# Patient Record
Sex: FEMALE | Race: WHITE | Employment: FULL TIME | URBAN - METROPOLITAN AREA
[De-identification: names, ages, dates, MRNs, and addresses within clinical notes are randomized per-mention and may not be internally consistent; named-entity substitution may affect disease eponyms.]

---

## 2017-01-17 LAB
ALT SERPL-CCNC: 44 U/L (ref 14–63)
AST SERPL-CCNC: 25 U/L (ref 15–37)

## 2017-02-14 ENCOUNTER — TRANSFERRED RECORDS (OUTPATIENT)
Dept: HEALTH INFORMATION MANAGEMENT | Facility: CLINIC | Age: 32
End: 2017-02-14

## 2018-05-28 ENCOUNTER — TRANSFERRED RECORDS (OUTPATIENT)
Dept: HEALTH INFORMATION MANAGEMENT | Facility: CLINIC | Age: 33
End: 2018-05-28

## 2018-05-28 LAB
ALT SERPL-CCNC: 198 U/L (ref 14–63)
AST SERPL-CCNC: 165 U/L (ref 15–37)
CREAT SERPL-MCNC: 0.64 MG/DL (ref 0.51–0.95)
GFR SERPL CREATININE-BSD FRML MDRD: >60 ML/MIN/1.73M2 (ref 60–150)
GLUCOSE SERPL-MCNC: 112 MG/DL (ref 74–100)
POTASSIUM SERPL-SCNC: 3.1 MMOL/L (ref 3.5–5.1)

## 2018-06-01 ENCOUNTER — TRANSFERRED RECORDS (OUTPATIENT)
Dept: HEALTH INFORMATION MANAGEMENT | Facility: CLINIC | Age: 33
End: 2018-06-01

## 2018-06-01 LAB — PAP SMEAR - HIM PATIENT REPORTED: NEGATIVE

## 2018-07-04 ENCOUNTER — TRANSFERRED RECORDS (OUTPATIENT)
Dept: HEALTH INFORMATION MANAGEMENT | Facility: CLINIC | Age: 33
End: 2018-07-04

## 2018-09-15 ENCOUNTER — TRANSFERRED RECORDS (OUTPATIENT)
Dept: HEALTH INFORMATION MANAGEMENT | Facility: CLINIC | Age: 33
End: 2018-09-15

## 2018-09-15 LAB
ALT SERPL-CCNC: 23 U/L (ref 14–63)
AST SERPL-CCNC: 27 U/L (ref 15–37)
CREAT SERPL-MCNC: 0.61 MG/DL (ref 0.51–0.95)
GFR SERPL CREATININE-BSD FRML MDRD: >60 ML/MIN/1.73M2 (ref 60–150)
GLUCOSE SERPL-MCNC: 93 MG/DL (ref 74–100)
POTASSIUM SERPL-SCNC: 3.2 MMOL/L (ref 3.5–5.1)

## 2019-02-27 ENCOUNTER — OFFICE VISIT (OUTPATIENT)
Dept: FAMILY MEDICINE | Facility: CLINIC | Age: 34
End: 2019-02-27
Payer: COMMERCIAL

## 2019-02-27 VITALS
SYSTOLIC BLOOD PRESSURE: 146 MMHG | HEART RATE: 100 BPM | TEMPERATURE: 98.9 F | DIASTOLIC BLOOD PRESSURE: 104 MMHG | WEIGHT: 187 LBS

## 2019-02-27 DIAGNOSIS — K50.919 CROHN'S DISEASE WITH COMPLICATION, UNSPECIFIED GASTROINTESTINAL TRACT LOCATION (H): ICD-10-CM

## 2019-02-27 DIAGNOSIS — R53.83 OTHER FATIGUE: ICD-10-CM

## 2019-02-27 DIAGNOSIS — F41.1 GENERALIZED ANXIETY DISORDER: Primary | ICD-10-CM

## 2019-02-27 DIAGNOSIS — F10.29 ALCOHOL DEPENDENCE WITH UNSPECIFIED ALCOHOL-INDUCED DISORDER (H): ICD-10-CM

## 2019-02-27 DIAGNOSIS — I10 BENIGN ESSENTIAL HYPERTENSION: ICD-10-CM

## 2019-02-27 LAB
ALBUMIN UR-MCNC: 30 MG/DL
APPEARANCE UR: CLEAR
BACTERIA #/AREA URNS HPF: ABNORMAL /HPF
BILIRUB UR QL STRIP: NEGATIVE
COLOR UR AUTO: YELLOW
ERYTHROCYTE [DISTWIDTH] IN BLOOD BY AUTOMATED COUNT: 12.6 % (ref 10–15)
GLUCOSE UR STRIP-MCNC: NEGATIVE MG/DL
HCG UR QL: NEGATIVE
HCT VFR BLD AUTO: 45.6 % (ref 35–47)
HGB BLD-MCNC: 15.2 G/DL (ref 11.7–15.7)
HGB UR QL STRIP: NEGATIVE
KETONES UR STRIP-MCNC: ABNORMAL MG/DL
LEUKOCYTE ESTERASE UR QL STRIP: NEGATIVE
MCH RBC QN AUTO: 32.5 PG (ref 26.5–33)
MCHC RBC AUTO-ENTMCNC: 33.3 G/DL (ref 31.5–36.5)
MCV RBC AUTO: 97 FL (ref 78–100)
MUCOUS THREADS #/AREA URNS LPF: PRESENT /LPF
NITRATE UR QL: NEGATIVE
NON-SQ EPI CELLS #/AREA URNS LPF: ABNORMAL /LPF
PH UR STRIP: 5.5 PH (ref 5–7)
PLATELET # BLD AUTO: 147 10E9/L (ref 150–450)
RBC # BLD AUTO: 4.68 10E12/L (ref 3.8–5.2)
RBC #/AREA URNS AUTO: ABNORMAL /HPF
SOURCE: ABNORMAL
SP GR UR STRIP: >1.03 (ref 1–1.03)
UROBILINOGEN UR STRIP-ACNC: 0.2 EU/DL (ref 0.2–1)
WBC # BLD AUTO: 8.9 10E9/L (ref 4–11)
WBC #/AREA URNS AUTO: ABNORMAL /HPF

## 2019-02-27 PROCEDURE — 85027 COMPLETE CBC AUTOMATED: CPT | Performed by: FAMILY MEDICINE

## 2019-02-27 PROCEDURE — 81001 URINALYSIS AUTO W/SCOPE: CPT | Performed by: FAMILY MEDICINE

## 2019-02-27 PROCEDURE — 99203 OFFICE O/P NEW LOW 30 MIN: CPT | Performed by: FAMILY MEDICINE

## 2019-02-27 PROCEDURE — 80053 COMPREHEN METABOLIC PANEL: CPT | Performed by: FAMILY MEDICINE

## 2019-02-27 PROCEDURE — 36415 COLL VENOUS BLD VENIPUNCTURE: CPT | Performed by: FAMILY MEDICINE

## 2019-02-27 PROCEDURE — 81025 URINE PREGNANCY TEST: CPT | Performed by: FAMILY MEDICINE

## 2019-02-27 RX ORDER — LISINOPRIL 10 MG/1
1 TABLET ORAL DAILY
Refills: 0 | COMMUNITY
Start: 2019-01-21 | End: 2019-03-14

## 2019-02-27 RX ORDER — PREDNISONE 20 MG/1
30 TABLET ORAL
Refills: 0 | COMMUNITY
Start: 2018-07-05 | End: 2019-04-11

## 2019-02-27 RX ORDER — ATENOLOL 50 MG/1
1 TABLET ORAL DAILY
Refills: 0 | COMMUNITY
Start: 2019-01-21 | End: 2019-03-13 | Stop reason: ALTCHOICE

## 2019-02-27 RX ORDER — CITALOPRAM HYDROBROMIDE 20 MG/1
30 TABLET ORAL DAILY
Refills: 0 | COMMUNITY
Start: 2019-01-21 | End: 2019-03-13

## 2019-02-27 RX ORDER — HYDROCHLOROTHIAZIDE 12.5 MG/1
1 CAPSULE ORAL DAILY
Refills: 0 | COMMUNITY
Start: 2019-01-21 | End: 2019-03-14

## 2019-02-27 ASSESSMENT — ANXIETY QUESTIONNAIRES
IF YOU CHECKED OFF ANY PROBLEMS ON THIS QUESTIONNAIRE, HOW DIFFICULT HAVE THESE PROBLEMS MADE IT FOR YOU TO DO YOUR WORK, TAKE CARE OF THINGS AT HOME, OR GET ALONG WITH OTHER PEOPLE: EXTREMELY DIFFICULT
GAD7 TOTAL SCORE: 15
3. WORRYING TOO MUCH ABOUT DIFFERENT THINGS: NEARLY EVERY DAY
6. BECOMING EASILY ANNOYED OR IRRITABLE: NOT AT ALL
2. NOT BEING ABLE TO STOP OR CONTROL WORRYING: NEARLY EVERY DAY
5. BEING SO RESTLESS THAT IT IS HARD TO SIT STILL: NOT AT ALL
1. FEELING NERVOUS, ANXIOUS, OR ON EDGE: NEARLY EVERY DAY
7. FEELING AFRAID AS IF SOMETHING AWFUL MIGHT HAPPEN: NEARLY EVERY DAY

## 2019-02-27 ASSESSMENT — PATIENT HEALTH QUESTIONNAIRE - PHQ9
5. POOR APPETITE OR OVEREATING: NEARLY EVERY DAY
SUM OF ALL RESPONSES TO PHQ QUESTIONS 1-9: 17

## 2019-02-27 NOTE — PROGRESS NOTES
SUBJECTIVE:   Page Moreira is a 33 year old female who presents to clinic today for the following health issues:        Generalized fatigue abdominal discomfort.     History of Chrons disease - has had a recent flare. Has been taking an old prednisone script     Description:   Character: Dull ache and Cramping    Intensity: moderate    Progression of Symptoms:  worsening    Accompanying Signs & Symptoms:  Fever/Chills?: YES  Gas/Bloating: no   Nausea: YES  Vomitting: YES  Diarrhea?: YES  Constipation:no   Dysuria or Hematuria: no    History:   Trauma: no   Previous similar pain: YES   Previous tests done: Colonoscopy    Precipitating factors:   Does the pain change with:     Food: YES       Therapies Tried and outcome: started predisone yesterday     LMP:  2/5/2019     Patient came today to discuss symptoms of generalized fatigue tiredness increased anxiety.  She has history of alcohol abuse and currently seeing some psychologist but she is still drinking, last drink was almost 1 week ago.  She noticed slight increase in GI symptoms including increased diarrhea nausea.  She has history of Crohn's disease and she sees gastroenterologist.  Patient is currently at her mom house and she forgot to bring her medication for blood pressure.  She is currently on Celexa 20 mg for her anxiety depression.  She thinks her anxiety is slight worsening over the last 1 week due to her symptoms.  No abdominal focal tenderness.  No headaches no blurry vision no neurological symptoms.  No chest pain no shortness of breath.      Problem list and histories reviewed & adjusted, as indicated.      There is no problem list on file for this patient.    No past surgical history on file.    Social History     Tobacco Use     Smoking status: Current Every Day Smoker     Packs/day: 1.00     Types: Cigarettes     Smokeless tobacco: Never Used   Substance Use Topics     Alcohol use: Yes     No family history on file.      Current Outpatient  Medications   Medication Sig Dispense Refill     atenolol (TENORMIN) 50 MG tablet 1 tablet daily  0     citalopram (CELEXA) 20 MG tablet 30 mg daily  0     hydrochlorothiazide (MICROZIDE) 12.5 MG capsule 1 capsule daily  0     lisinopril (PRINIVIL/ZESTRIL) 10 MG tablet 1 tablet daily  0     predniSONE (DELTASONE) 20 MG tablet 30 mg.  0       Reviewed and updated as needed this visit by clinical staff  Tobacco  Allergies  Meds  Soc Hx      Reviewed and updated as needed this visit by Provider         ROS:  CONSTITUTIONAL: NEGATIVE for fever, chills, change in weight  ENT/MOUTH: NEGATIVE for ear, mouth and throat problems  RESP: NEGATIVE for significant cough or SOB  CV: NEGATIVE for chest pain, palpitations or peripheral edema    OBJECTIVE:                                                    BP (!) 146/104   Pulse 100   Temp 98.9  F (37.2  C) (Tympanic)   Wt 84.8 kg (187 lb)   LMP 02/05/2019 (Approximate)   There is no height or weight on file to calculate BMI.   GENERAL: healthy, alert, well nourished, well hydrated, no distress  NECK: no tenderness, no adenopathy, no asymmetry, no masses, no stiffness; thyroid- normal to palpation  RESP: lungs clear to auscultation - no rales, no rhonchi, no wheezes  CV: regular rates and rhythm, normal S1 S2, no S3 or S4 and no murmur, no click or rub -  ABDOMEN: soft, no tenderness, no  hepatosplenomegaly, no masses, normal bowel sounds  NEURO: strength and tone- normal, sensory exam- grossly normal, mentation- intact, speech- normal, reflexes- symmetric  Increase anxiety.     ASSESSMENT/PLAN:                                                        ICD-10-CM    1. Generalized anxiety disorder F41.1    2. Alcohol dependence with unspecified alcohol-induced disorder (H) F10.29    3. Benign essential hypertension I10    4. Crohn's disease with complication, unspecified gastrointestinal tract location (H) K50.919 CBC with platelets   5. Other fatigue R53.83 CBC with platelets      Comprehensive metabolic panel     UA reflex to Microscopic     HCG Qual, Urine ([id number])       Patient labs including CBC and urine are noncontributory.  Her symptoms could be due to mild to moderate flare of her Crohn's versus slight increase in anxiety.  Patient advised to get her medication and start taking her blood pressure medication.  She is working with psychology for alcohol issues.  Warning signs were discussed with the patient.  Any worsening fever, chest pain shortness of breath or any abdominal discomfort which is worsening or focal tenderness she may need to be seen in the ER for further evaluation.  Patient has slight increased anxiety due to her work, along with her symptoms.  She is advised if symptoms are improved her anxiety might be better.  However I advised her if she is worsening in terms of anxiety or symptoms she may need to be seen sooner.    Fam Marx MD  [facility] [place]

## 2019-02-27 NOTE — Clinical Note
Please abstract the following data from this visit with this patient into the appropriate field in Epic:Pap smear done on this date: 06/2018 (approximately), by this group: [facility], results were normal.

## 2019-02-27 NOTE — LETTER
March 4, 2019      Page Moreira  [address]  [place] [address]        Dear ,        I have reviewed your recent labs. Here are the results:     -Normal red blood cell (hgb) levels, normal white blood cell count and platelets are low normal..   -Liver and gallbladder tests are normal (ALT,AST, Alk phos, bilirubin), kidney function is normal (Cr, GFR), sodium is normal, potassium is normal, calcium is normal, glucose is normal for not in diabetic range.     Resulted Orders   CBC with platelets   Result Value Ref Range    WBC 8.9 4.0 - 11.0 10e9/L    RBC Count 4.68 3.8 - 5.2 10e12/L    Hemoglobin 15.2 11.7 - 15.7 g/dL    Hematocrit 45.6 35.0 - 47.0 %    MCV 97 78 - 100 fl    MCH 32.5 26.5 - 33.0 pg    MCHC 33.3 31.5 - 36.5 g/dL    RDW 12.6 10.0 - 15.0 %    Platelet Count 147 (L) 150 - 450 10e9/L   Comprehensive metabolic panel   Result Value Ref Range    Sodium 138 133 - 144 mmol/L    Potassium 3.8 3.4 - 5.3 mmol/L    Chloride 104 94 - 109 mmol/L    Carbon Dioxide 26 20 - 32 mmol/L    Anion Gap 8 3 - 14 mmol/L    Glucose 121 (H) 70 - 99 mg/dL    Urea Nitrogen 12 7 - 30 mg/dL    Creatinine 0.58 0.52 - 1.04 mg/dL    GFR Estimate >90 >60 mL/min/[1.73_m2]      Comment:      Non  GFR Calc  Starting 12/18/2018, serum creatinine based estimated GFR (eGFR) will be   calculated using the Chronic Kidney Disease Epidemiology Collaboration   (CKD-EPI) equation.      GFR Estimate If Black >90 >60 mL/min/[1.73_m2]      Comment:       GFR Calc  Starting 12/18/2018, serum creatinine based estimated GFR (eGFR) will be   calculated using the Chronic Kidney Disease Epidemiology Collaboration   (CKD-EPI) equation.      Calcium 9.0 8.5 - 10.1 mg/dL    Bilirubin Total 0.3 0.2 - 1.3 mg/dL    Albumin 4.2 3.4 - 5.0 g/dL    Protein Total 7.6 6.8 - 8.8 g/dL    Alkaline Phosphatase 55 40 - 150 U/L    ALT 30 0 - 50 U/L    AST 32 0 - 45 U/L   UA reflex to Microscopic   Result Value Ref Range    Color  Urine Yellow     Appearance Urine Clear     Glucose Urine Negative NEG^Negative mg/dL    Bilirubin Urine Negative NEG^Negative    Ketones Urine Trace (A) NEG^Negative mg/dL    Specific Gravity Urine >1.030 1.003 - 1.035    Blood Urine Negative NEG^Negative    pH Urine 5.5 5.0 - 7.0 pH    Protein Albumin Urine 30 (A) NEG^Negative mg/dL    Urobilinogen Urine 0.2 0.2 - 1.0 EU/dL    Nitrite Urine Negative NEG^Negative    Leukocyte Esterase Urine Negative NEG^Negative    Source Midstream Urine    HCG Qual, Urine ([id number])   Result Value Ref Range    HCG Qual Urine Negative NEG^Negative      Comment:      This test is for screening purposes.  Results should be interpreted along with   the clinical picture.  Confirmation testing is available if warranted by   ordering [id number], HCG Quantitative Pregnancy.     Urine Microscopic   Result Value Ref Range    WBC Urine 0 - 5 OTO5^0 - 5 /HPF    RBC Urine O - 2 OTO2^O - 2 /HPF    Squamous Epithelial /LPF Urine Moderate (A) FEW^Few /LPF    Bacteria Urine Moderate (A) NEG^Negative /HPF    Mucous Urine Present (A) NEG^Negative /LPF       If you have any questions or concerns, please call the clinic at the number listed above.       Sincerely,    Fam Marx MD

## 2019-02-28 ENCOUNTER — TELEPHONE (OUTPATIENT)
Dept: FAMILY MEDICINE | Facility: CLINIC | Age: 34
End: 2019-02-28

## 2019-02-28 LAB
ALBUMIN SERPL-MCNC: 4.2 G/DL (ref 3.4–5)
ALP SERPL-CCNC: 55 U/L (ref 40–150)
ALT SERPL W P-5'-P-CCNC: 30 U/L (ref 0–50)
ANION GAP SERPL CALCULATED.3IONS-SCNC: 8 MMOL/L (ref 3–14)
AST SERPL W P-5'-P-CCNC: 32 U/L (ref 0–45)
BILIRUB SERPL-MCNC: 0.3 MG/DL (ref 0.2–1.3)
BUN SERPL-MCNC: 12 MG/DL (ref 7–30)
CALCIUM SERPL-MCNC: 9 MG/DL (ref 8.5–10.1)
CHLORIDE SERPL-SCNC: 104 MMOL/L (ref 94–109)
CO2 SERPL-SCNC: 26 MMOL/L (ref 20–32)
CREAT SERPL-MCNC: 0.58 MG/DL (ref 0.52–1.04)
GFR SERPL CREATININE-BSD FRML MDRD: >90 ML/MIN/{1.73_M2}
GLUCOSE SERPL-MCNC: 121 MG/DL (ref 70–99)
POTASSIUM SERPL-SCNC: 3.8 MMOL/L (ref 3.4–5.3)
PROT SERPL-MCNC: 7.6 G/DL (ref 6.8–8.8)
SODIUM SERPL-SCNC: 138 MMOL/L (ref 133–144)

## 2019-02-28 ASSESSMENT — ANXIETY QUESTIONNAIRES: GAD7 TOTAL SCORE: 15

## 2019-02-28 NOTE — TELEPHONE ENCOUNTER
----- Message from [employer] sent at 2/28/2019  3:52 PM CST -----      Topic: General Compliment      Hello, I was in on2/27/19   My employer isrequesing a signedform just stating that I came in for an appt.   Please send me a letter ASAP via email:   [email or web address]   Thank you,   Page Moreira     Letter pended for provider

## 2019-02-28 NOTE — LETTER
February 28, 2019    Page Moreira  [address]  [place] [address]  [employer]      Page Moreira saw me and was treated on 2/28/2019. Patient may return to work with no medical restrictions        COMMENTS: None        Sincerely,      Dr. Fam Baez

## 2019-02-28 NOTE — LETTER
[facility] [place]   [address] [place] [address]  [phone]    March 1, 2019    Page Moreira  [address] [address]      To Whom it May Concern:    Patient was seen in the clinic 2/27/19 by Dr. Marx. Please contact me with questions or concerns.      Sincerely,    Salud COLUNGAN, RN

## 2019-03-01 NOTE — TELEPHONE ENCOUNTER
Letter was sent and informed patient. Patient received letter.     Salud COLUNGAN, RN   [facility]

## 2019-03-01 NOTE — TELEPHONE ENCOUNTER
Please make that letter stating.    Patient was seen in the clinic 2/27/19.    RN or LPN can sign, not in clinic today.

## 2019-03-13 ENCOUNTER — OFFICE VISIT (OUTPATIENT)
Dept: FAMILY MEDICINE | Facility: CLINIC | Age: 34
End: 2019-03-13
Payer: COMMERCIAL

## 2019-03-13 VITALS
TEMPERATURE: 96.1 F | BODY MASS INDEX: 30.99 KG/M2 | SYSTOLIC BLOOD PRESSURE: 111 MMHG | WEIGHT: 186 LBS | DIASTOLIC BLOOD PRESSURE: 83 MMHG | HEIGHT: 65 IN | HEART RATE: 85 BPM

## 2019-03-13 DIAGNOSIS — F10.29 ALCOHOL DEPENDENCE WITH UNSPECIFIED ALCOHOL-INDUCED DISORDER (H): ICD-10-CM

## 2019-03-13 DIAGNOSIS — I10 ESSENTIAL HYPERTENSION: Primary | ICD-10-CM

## 2019-03-13 DIAGNOSIS — F41.1 GENERALIZED ANXIETY DISORDER: ICD-10-CM

## 2019-03-13 DIAGNOSIS — F33.1 MODERATE EPISODE OF RECURRENT MAJOR DEPRESSIVE DISORDER (H): ICD-10-CM

## 2019-03-13 DIAGNOSIS — Z30.013 ENCOUNTER FOR INITIAL PRESCRIPTION OF INJECTABLE CONTRACEPTIVE: ICD-10-CM

## 2019-03-13 PROCEDURE — 99214 OFFICE O/P EST MOD 30 MIN: CPT | Mod: 25 | Performed by: INTERNAL MEDICINE

## 2019-03-13 PROCEDURE — 96372 THER/PROPH/DIAG INJ SC/IM: CPT | Performed by: INTERNAL MEDICINE

## 2019-03-13 RX ORDER — NALTREXONE HYDROCHLORIDE 50 MG/1
50 TABLET, FILM COATED ORAL DAILY
Qty: 90 TABLET | Refills: 1 | Status: SHIPPED | OUTPATIENT
Start: 2019-03-13 | End: 2020-05-08

## 2019-03-13 RX ORDER — MEDROXYPROGESTERONE ACETATE 150 MG/ML
150 INJECTION, SUSPENSION INTRAMUSCULAR
Status: DISCONTINUED | OUTPATIENT
Start: 2019-03-13 | End: 2020-05-01

## 2019-03-13 RX ORDER — LISINOPRIL AND HYDROCHLOROTHIAZIDE 20; 25 MG/1; MG/1
1 TABLET ORAL DAILY
Qty: 90 TABLET | Refills: 1 | Status: SHIPPED | OUTPATIENT
Start: 2019-03-13 | End: 2019-08-26

## 2019-03-13 RX ADMIN — MEDROXYPROGESTERONE ACETATE 150 MG: 150 INJECTION, SUSPENSION INTRAMUSCULAR at 14:01

## 2019-03-13 ASSESSMENT — MIFFLIN-ST. JEOR: SCORE: 1565.18

## 2019-03-13 NOTE — PROGRESS NOTES
SUBJECTIVE:   Page Moreira is a 33 year old female who presents to clinic today for the following health issues:      Depression and anxiety -she is currently taking citalopram 30 mg.  She has been on this for several years, never really felt like it helped very much.  Currently she is mostly dealing with anxiety symptoms.  She has not tried anything else prior to the citalopram.  She does not have any family members or friends on antidepressants.    She has an outpatient counseler for alcohol abuse and is enrolled in DBT therapy.  She is currently drinking a couple times a week.  Her counselor recommended she discuss naltrexone with her doctor.  She does struggle quite a bit with alcohol cravings, thinks about it all the time.    HTN - she is currently on lisinopril 20 mg (which was increased from 10 mg a few weeks ago), hydrochlorothiazide 12.5 mg, and atenolol 50 mg.  She feels like 3 medications as a lot and is wondering if we can decrease the number of pills she takes.  Her blood pressure today is quite a bit better than it was when she was seen a couple weeks ago.  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 Does not follow a particular diet.     Birth control -Page would like to restart on Depo-Provera.  She took OCPs in the past and did not like how they worked.  She also has used apple, and had good results with that.  She has a long term boyfriend who lives in [place].  She knows she never wants children, neither does he.  Wondering about long-term surgical options for birth control.       PHQ 2/27/2019   PHQ-9 Total Score 17   Q9: Suicide Ideation Not at all     KIERRA-7 SCORE 2/27/2019   Total Score 15           Reviewed and updated as needed this visit by clinical staff  Tobacco  Allergies  Meds  Med Hx  Surg Hx  Fam Hx  Soc Hx      Reviewed and updated as needed this visit by Provider         ROS:  CV, pulm, psych reviewed,  otherwise negative unless noted  "above.       OBJECTIVE:     /83   Pulse 85   Temp 96.1  F (35.6  C)   Ht 1.66 m (5' 5.35\")   Wt 84.4 kg (186 lb)   LMP 02/06/2019   BMI 30.62 kg/m    Body mass index is 30.62 kg/m .     GENERAL: healthy, alert and no distress  RESP: lungs clear to auscultation - no rales, rhonchi or wheezes  CV: regular rate and rhythm, normal S1 S2, no S3 or S4, no murmur, click or rub  PSYCH: mentation appears normal, affect normal/bright        ASSESSMENT/PLAN:       1. Essential hypertension  Her BP is well controlled today.  Will stop atenolol and increase her hydrochlorothiazide dose, and she can take the combination pill lisinopril [id number].  This should give her good BP control and she can go down to one pill from 3 pills.   - lisinopril-hydrochlorothiazide (PRINZIDE/ZESTORETIC) 20-25 MG tablet; Take 1 tablet by mouth daily  Dispense: 90 tablet; Refill: 1    2. Alcohol dependence with unspecified alcohol-induced disorder (H)  She is engaged with outpatient substance abuse counseling, reasonable to add naltrexone.    - naltrexone (DEPADE/REVIA) 50 MG tablet; Take 1 tablet (50 mg) by mouth daily  Dispense: 90 tablet; Refill: 1    3. Moderate episode of recurrent major depressive disorder (H)  Will try another serotonin specific reuptake inhibitor as she has only tried citalopram.    - sertraline (ZOLOFT) 50 MG tablet; Take 1 tablet (50 mg) by mouth daily If doing well after 2 weeks, can increase to 100mg  Dispense: 90 tablet; Refill: 1    4. Generalized anxiety disorder  - sertraline (ZOLOFT) 50 MG tablet; Take 1 tablet (50 mg) by mouth daily If doing well after 2 weeks, can increase to 100mg  Dispense: 90 tablet; Refill: 1    5. Encounter for initial prescription of injectable contraceptive  She had a negative UPT at her visit 2/22 and hasn't had intercourse since then.   - medroxyPROGESTERone (DEPO-PROVERA) injection 150 mg    Follow up - nurse BP check in 2-4 weeks.  Visit with me for mental health in 2 " months, this can be e-visit or phone visit if she'd prefer.     Gisselle Jeffery MD  [facility] [place]

## 2019-03-13 NOTE — PATIENT INSTRUCTIONS
Lisinopril 20mg-hydrochlorothiazide 25mg once daily   Sertraline 50mg for 2 weeks, if doing well on that and would like to go up, can increase to 100mg daily.    Naltrexone for alcohol cravings.

## 2019-03-14 PROBLEM — F33.1 MODERATE EPISODE OF RECURRENT MAJOR DEPRESSIVE DISORDER (H): Status: ACTIVE | Noted: 2019-03-14

## 2019-03-14 PROBLEM — I10 ESSENTIAL HYPERTENSION: Status: ACTIVE | Noted: 2019-03-14

## 2019-03-14 PROBLEM — Z30.42 DEPO-PROVERA CONTRACEPTIVE STATUS: Status: ACTIVE | Noted: 2019-03-14

## 2019-03-14 PROBLEM — K50.90 CROHN'S DISEASE WITHOUT COMPLICATION (H): Status: ACTIVE | Noted: 2018-10-23

## 2019-03-14 PROBLEM — F10.29 ALCOHOL DEPENDENCE WITH UNSPECIFIED ALCOHOL-INDUCED DISORDER (H): Status: ACTIVE | Noted: 2019-03-14

## 2019-03-14 PROBLEM — F41.1 GENERALIZED ANXIETY DISORDER: Status: ACTIVE | Noted: 2019-03-14

## 2019-04-11 ENCOUNTER — OFFICE VISIT (OUTPATIENT)
Dept: FAMILY MEDICINE | Facility: CLINIC | Age: 34
End: 2019-04-11
Payer: COMMERCIAL

## 2019-04-11 VITALS
WEIGHT: 185 LBS | DIASTOLIC BLOOD PRESSURE: 76 MMHG | HEIGHT: 65 IN | HEART RATE: 135 BPM | SYSTOLIC BLOOD PRESSURE: 100 MMHG | TEMPERATURE: 98.9 F | OXYGEN SATURATION: 97 % | BODY MASS INDEX: 30.82 KG/M2

## 2019-04-11 DIAGNOSIS — F33.1 MODERATE EPISODE OF RECURRENT MAJOR DEPRESSIVE DISORDER (H): ICD-10-CM

## 2019-04-11 DIAGNOSIS — F10.29 ALCOHOL DEPENDENCE WITH UNSPECIFIED ALCOHOL-INDUCED DISORDER (H): ICD-10-CM

## 2019-04-11 DIAGNOSIS — J01.00 ACUTE NON-RECURRENT MAXILLARY SINUSITIS: Primary | ICD-10-CM

## 2019-04-11 PROCEDURE — 99213 OFFICE O/P EST LOW 20 MIN: CPT | Performed by: INTERNAL MEDICINE

## 2019-04-11 RX ORDER — CODEINE PHOSPHATE AND GUAIFENESIN 10; 100 MG/5ML; MG/5ML
1-2 SOLUTION ORAL EVERY 4 HOURS PRN
Qty: 120 ML | Refills: 0 | Status: SHIPPED | OUTPATIENT
Start: 2019-04-11 | End: 2019-04-16

## 2019-04-11 RX ORDER — AZITHROMYCIN 250 MG/1
TABLET, FILM COATED ORAL
Qty: 6 TABLET | Refills: 0 | Status: SHIPPED | OUTPATIENT
Start: 2019-04-11 | End: 2019-04-16

## 2019-04-11 RX ORDER — BENZONATATE 100 MG/1
100-200 CAPSULE ORAL 3 TIMES DAILY PRN
Qty: 30 CAPSULE | Refills: 1 | Status: SHIPPED | OUTPATIENT
Start: 2019-04-11 | End: 2019-05-15

## 2019-04-11 ASSESSMENT — MIFFLIN-ST. JEOR: SCORE: 1555.03

## 2019-04-11 NOTE — PROGRESS NOTES
"  SUBJECTIVE:   Page Moreira is a 33 year old female who presents to clinic today for the following   health issues:      RESPIRATORY SYMPTOMS      Duration: 1 week     Description  nasal congestion, rhinorrhea, cough, wheezing and fatigue/malaise    Severity: n/a    Accompanying signs and symptoms: None    History (predisposing factors):  tobacco abuse    Precipitating or alleviating factors: None    Therapies tried and outcome:  guaifenesin    Cough and chest pain and sinus congestion with drainage.  Getting worse.  No fevers.  Milk SOB.  No known sick contacts.        Depression and anxiety doing much better on sertraline, she is just taking the 50mg and that works great.      Naltrexone has been very helpful for her alcohol cravings, denies side effects.      Liking just taking one BP medication.        ROS:  Constitutional, HEENT, cardiovascular, pulmonary, gi and gu systems are negative, except as otherwise noted.    OBJECTIVE:     /76 (BP Location: Right arm, Cuff Size: Adult Large)   Pulse 135   Temp 98.9  F (37.2  C) (Tympanic)   Ht 1.651 m (5' 5\")   Wt 83.9 kg (185 lb)   SpO2 97%   BMI 30.79 kg/m    Body mass index is 30.79 kg/m .    Gen: ill appearing, pleasant woman, no distress  HEENT: PERRL, no conjunctival injection, mild posterior pharynx erythema, MMM.  TM normal b/l.  No sinus tenderness.   Neck: supple, no LAD  Pulm: breathing comfortably, CTAB, no wheezes or rales  CV: mild tachycardia, regular, normal S1 and S2, no murmurs  Ext: 2+ radial pulses        ASSESSMENT/PLAN:       1. Acute non-recurrent maxillary sinusitis  Bronchitis vs sinusitis, at this point not sure there is any bacterial component.  Recommended nasal saline irrigation and nasal sprays, OTC meds.  Cough suppressants.  If not improving couple days, okay to go ahead and fill antibiotics for sinusitis not improving at 10 days.  - azithromycin (ZITHROMAX) 250 MG tablet; Two tablets first day, then one tablet daily for four " days.  Dispense: 6 tablet; Refill: 0  - benzonatate (TESSALON) 100 MG capsule; Take 1-2 capsules (100-200 mg) by mouth 3 times daily as needed for cough  Dispense: 30 capsule; Refill: 1  - guaiFENesin-codeine (ROBITUSSIN AC) 100-10 MG/5ML solution; Take 5-10 mLs by mouth every 4 hours as needed for cough  Dispense: 120 mL; Refill: 0    2. Alcohol dependence with unspecified alcohol-induced disorder (H)  Doing well on naltrexone.    3. Moderate episode of recurrent major depressive disorder (H)  Symptoms much improved on sertraline.        Gisselle Jeffery MD  [facility] [place]

## 2019-04-11 NOTE — PATIENT INSTRUCTIONS
Try netti pot twice daily, followed by flonase nasal spray.  Try Afrin over the counter (don't use for more than 3 days).  If no improvement in 24-48hrs, okay to fill antibiotics.

## 2019-04-11 NOTE — LETTER
My Depression Action Plan  Name: Page Moreira   Date of Birth 1985  Date: 4/11/2019    My doctor: Gisselle Jeffery   My clinic: [address] [address]  [phone]          GREEN    ZONE   Good Control    What it looks like:     Things are going generally well. You have normal up s and down s. You may even feel depressed from time to time, but bad moods usually last less than a day.   What you need to do:  1. Continue to care for yourself (see self care plan)  2. Check your depression survival kit and update it as needed  3. Follow your physician s recommendations including any medication.  4. Do not stop taking medication unless you consult with your physician first.           YELLOW         ZONE Getting Worse    What it looks like:     Depression is starting to interfere with your life.     It may be hard to get out of bed; you may be starting to isolate yourself from others.    Symptoms of depression are starting to last most all day and this has happened for several days.     You may have suicidal thoughts but they are not constant.   What you need to do:     1. Call your care team, your response to treatment will improve if you keep your care team informed of your progress. Yellow periods are signs an adjustment may need to be made.     2. Continue your self-care, even if you have to fake it!    3. Talk to someone in your support network    4. Open up your depression survival kit           RED    ZONE Medical Alert - Get Help    What it looks like:     Depression is seriously interfering with your life.     You may experience these or other symptoms: You can t get out of bed most days, can t work or engage in other necessary activities, you have trouble taking care of basic hygiene, or basic responsibilities, thoughts of suicide or death that will not go away, self-injurious behavior.     What you need to do:  1. Call your care  team and request a same-day appointment. If they are not available (weekends or after hours) call your local crisis line, emergency room or 911.            Depression Self Care Plan / Survival Kit    Self-Care for Depression  Here s the deal. Your body and mind are really not as separate as most people think.  What you do and think affects how you feel and how you feel influences what you do and think. This means if you do things that people who feel good do, it will help you feel better.  Sometimes this is all it takes.  There is also a place for medication and therapy depending on how severe your depression is, so be sure to consult with your medical provider and/ or Behavioral Health Consultant if your symptoms are worsening or not improving.     In order to better manage my stress, I will:    Exercise  Get some form of exercise, every day. This will help reduce pain and release endorphins, the  feel good  chemicals in your brain. This is almost as good as taking antidepressants!  This is not the same as joining a gym and then never going! (they count on that by the way ) It can be as simple as just going for a walk or doing some gardening, anything that will get you moving.      Hygiene   Maintain good hygiene (Get out of bed in the morning, Make your bed, Brush your teeth, Take a shower, and Get dressed like you were going to work, even if you are unemployed).  If your clothes don't fit try to get ones that do.    Diet  I will strive to eat foods that are good for me, drink plenty of water, and avoid excessive sugar, caffeine, alcohol, and other mood-altering substances.  Some foods that are helpful in depression are: complex carbohydrates, B vitamins, flaxseed, fish or fish oil, fresh fruits and vegetables.    Psychotherapy  I agree to participate in Individual Therapy (if recommended).    Medication  If prescribed medications, I agree to take them.  Missing doses can result in serious side effects.  I  understand that drinking alcohol, or other illicit drug use, may cause potential side effects.  I will not stop my medication abruptly without first discussing it with my provider.    Staying Connected With Others  I will stay in touch with my friends, family members, and my primary care provider/team.    Use your imagination  Be creative.  We all have a creative side; it doesn t matter if it s oil painting, sand castles, or mud pies! This will also kick up the endorphins.    Witness Beauty  (AKA stop and smell the roses) Take a look outside, even in mid-winter. Notice colors, textures. Watch the squirrels and birds.     Service to others  Be of service to others.  There is always someone else in need.  By helping others we can  get out of ourselves  and remember the really important things.  This also provides opportunities for practicing all the other parts of the program.    Humor  Laugh and be silly!  Adjust your TV habits for less news and crime-drama and more comedy.    Control your stress  Try breathing deep, massage therapy, biofeedback, and meditation. Find time to relax each day.     My support system    Clinic Contact:  Phone number:    Contact 1:  Phone number:    Contact 2:  Phone number:    Oriental orthodox/:  Phone number:    Therapist:  Phone number:    Local crisis center:    Phone number:    Other community support:  Phone number:

## 2019-04-16 ENCOUNTER — OFFICE VISIT (OUTPATIENT)
Dept: FAMILY MEDICINE | Facility: CLINIC | Age: 34
End: 2019-04-16
Payer: COMMERCIAL

## 2019-04-16 VITALS
HEART RATE: 104 BPM | BODY MASS INDEX: 30.79 KG/M2 | WEIGHT: 185 LBS | TEMPERATURE: 98.3 F | DIASTOLIC BLOOD PRESSURE: 84 MMHG | SYSTOLIC BLOOD PRESSURE: 132 MMHG

## 2019-04-16 DIAGNOSIS — Z01.84 IMMUNITY STATUS TESTING: ICD-10-CM

## 2019-04-16 DIAGNOSIS — Z11.1 VISIT FOR MANTOUX TEST: Primary | ICD-10-CM

## 2019-04-16 DIAGNOSIS — Z23 NEED FOR PROPHYLACTIC VACCINATION AND INOCULATION AGAINST INFLUENZA: ICD-10-CM

## 2019-04-16 PROCEDURE — 86580 TB INTRADERMAL TEST: CPT | Performed by: NURSE PRACTITIONER

## 2019-04-16 PROCEDURE — 36415 COLL VENOUS BLD VENIPUNCTURE: CPT | Performed by: NURSE PRACTITIONER

## 2019-04-16 PROCEDURE — 86787 VARICELLA-ZOSTER ANTIBODY: CPT | Performed by: NURSE PRACTITIONER

## 2019-04-16 PROCEDURE — 99213 OFFICE O/P EST LOW 20 MIN: CPT | Mod: 25 | Performed by: NURSE PRACTITIONER

## 2019-04-16 PROCEDURE — 90686 IIV4 VACC NO PRSV 0.5 ML IM: CPT | Performed by: NURSE PRACTITIONER

## 2019-04-16 PROCEDURE — 90471 IMMUNIZATION ADMIN: CPT | Performed by: NURSE PRACTITIONER

## 2019-04-16 NOTE — PROGRESS NOTES
SUBJECTIVE:   Page Moreira is a 33 year old female who presents to clinic today for the following   health issues:      Concern - Pt is here for immunizations and titers for school.     HPI: Page presents today to update her immunizations, have TB skin test placed, and secure documentation of immunity to chicken pox. 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 No current illness symptoms. No past or current symptoms of TB and no known exposures.      Additional history: as documented    Reviewed  and updated as needed this visit by clinical staff  Tobacco  Allergies  Meds  Problems  Med Hx  Surg Hx  Fam Hx  Soc Hx          Reviewed and updated as needed this visit by Provider  Tobacco  Allergies  Meds  Problems  Med Hx  Surg Hx  Fam Hx         Patient Active Problem List   Diagnosis     Essential hypertension     Moderate episode of recurrent major depressive disorder (H)     Alcohol dependence with unspecified alcohol-induced disorder (H)     Generalized anxiety disorder     Crohn's disease without complication (H)     Depo-Provera contraceptive status     History reviewed. No pertinent surgical history.    Social History     Tobacco Use     Smoking status: Current Every Day Smoker     Packs/day: 1.00     Types: Cigarettes     Smokeless tobacco: Never Used   Substance Use Topics     Alcohol use: Yes     History reviewed. No pertinent family history.        ROS:  Constitutional, pulmonary systems are negative, except as otherwise noted.    OBJECTIVE:     /84   Pulse 104   Temp 98.3  F (36.8  C) (Tympanic)   Wt 83.9 kg (185 lb)   LMP 04/09/2019   BMI 30.79 kg/m    Body mass index is 30.79 kg/m .  GENERAL: healthy, alert and no distress  EYES: Eyes grossly normal to inspection, PERRL and conjunctivae and sclerae normal  NEURO: Normal strength and tone, mentation intact and speech  normal  PSYCH: mentation appears normal, affect normal/bright    Diagnostic Test Results:  none     ASSESSMENT/PLAN:     Page was seen today for imm/inj. After a thorough reconciliation of her immunizations, it was determined that she needs a flu shot, a current TB skin test, and a varicella zoster titer (she claims to have had chicken pox as a child; did not get varicella vaccine). Will follow up with her with these results. She will come back on Friday to have Mantoux read. Ideally, varicella titer result will be available by then, so she can submit all required paperwork. Agrees with plan, and all questions answered.     Diagnoses and all orders for this visit:    Visit for Mantoux test  -     TB INTRADERMAL TEST    Need for prophylactic vaccination and inoculation against influenza  -     FLU VACCINE, SPLIT VIRUS, IM (QUADRIVALENT) [26291]- >3 YRS  -     Vaccine Administration, Initial [95824]    Immunity status testing  -     Varicella Zoster Virus Antibody IgG        See Patient Instructions    Shad Morse NP  [facility] [place]      Injectable Influenza Immunization Documentation    1.  Is the person to be vaccinated sick today?   No    2. Does the person to be vaccinated have an allergy to a component   of the vaccine?   No  Egg Allergy Algorithm Link    3. Has the person to be vaccinated ever had a serious reaction   to influenza vaccine in the past?   No    4. Has the person to be vaccinated ever had Guillain-Barré syndrome?   No    Form completed by Shad Morse NP on 4/16/2019 at 11:55 AM

## 2019-04-17 LAB — VZV IGG SER QL IA: 2.5 AI (ref 0–0.8)

## 2019-04-18 ENCOUNTER — ALLIED HEALTH/NURSE VISIT (OUTPATIENT)
Dept: NURSING | Facility: CLINIC | Age: 34
End: 2019-04-18
Payer: COMMERCIAL

## 2019-04-18 DIAGNOSIS — Z11.1 SCREENING EXAMINATION FOR PULMONARY TUBERCULOSIS: Primary | ICD-10-CM

## 2019-04-18 DIAGNOSIS — H52.7 REFRACTION ERROR: Primary | ICD-10-CM

## 2019-04-18 LAB
PPDINDURATION: 0 MM (ref 0–5)
PPDREDNESS: NORMAL MM

## 2019-04-18 PROCEDURE — 99207 ZZC NO CHARGE NURSE ONLY: CPT

## 2019-04-24 ENCOUNTER — TELEPHONE (OUTPATIENT)
Dept: FAMILY MEDICINE | Facility: CLINIC | Age: 34
End: 2019-04-24

## 2019-04-24 NOTE — TELEPHONE ENCOUNTER
Patient's school received lab work letter, but not the TB results.     Fax to [phone] with attn to Anastasiia

## 2019-05-15 ENCOUNTER — OFFICE VISIT (OUTPATIENT)
Dept: FAMILY MEDICINE | Facility: CLINIC | Age: 34
End: 2019-05-15
Payer: COMMERCIAL

## 2019-05-15 VITALS
DIASTOLIC BLOOD PRESSURE: 78 MMHG | TEMPERATURE: 98.3 F | OXYGEN SATURATION: 96 % | BODY MASS INDEX: 30.57 KG/M2 | HEIGHT: 65 IN | SYSTOLIC BLOOD PRESSURE: 132 MMHG | WEIGHT: 183.5 LBS | HEART RATE: 122 BPM

## 2019-05-15 DIAGNOSIS — T36.95XA ANTIBIOTIC-INDUCED YEAST INFECTION: ICD-10-CM

## 2019-05-15 DIAGNOSIS — J40 WHEEZY BRONCHITIS: Primary | ICD-10-CM

## 2019-05-15 DIAGNOSIS — B37.0 THRUSH: ICD-10-CM

## 2019-05-15 DIAGNOSIS — L08.9 LOCAL INFECTION OF SKIN AND SUBCUTANEOUS TISSUE: ICD-10-CM

## 2019-05-15 DIAGNOSIS — B37.9 ANTIBIOTIC-INDUCED YEAST INFECTION: ICD-10-CM

## 2019-05-15 PROCEDURE — 99214 OFFICE O/P EST MOD 30 MIN: CPT | Performed by: INTERNAL MEDICINE

## 2019-05-15 RX ORDER — ALBUTEROL SULFATE 90 UG/1
2 AEROSOL, METERED RESPIRATORY (INHALATION) EVERY 4 HOURS PRN
Qty: 18 G | Refills: 3 | Status: SHIPPED | OUTPATIENT
Start: 2019-05-15 | End: 2020-05-04

## 2019-05-15 RX ORDER — NYSTATIN 100000/ML
500000 SUSPENSION, ORAL (FINAL DOSE FORM) ORAL 4 TIMES DAILY
Qty: 60 ML | Refills: 1 | Status: SHIPPED | OUTPATIENT
Start: 2019-05-15 | End: 2019-08-26

## 2019-05-15 RX ORDER — FLUCONAZOLE 150 MG/1
150 TABLET ORAL ONCE
Qty: 1 TABLET | Refills: 0 | Status: SHIPPED | OUTPATIENT
Start: 2019-05-15 | End: 2019-08-26

## 2019-05-15 RX ORDER — ALBUTEROL SULFATE 90 UG/1
2 AEROSOL, METERED RESPIRATORY (INHALATION) EVERY 6 HOURS
Qty: 18 G | Refills: 3 | Status: SHIPPED | OUTPATIENT
Start: 2019-05-15 | End: 2019-05-15

## 2019-05-15 RX ORDER — PREDNISONE 20 MG/1
40 TABLET ORAL DAILY
Qty: 10 TABLET | Refills: 0 | Status: SHIPPED | OUTPATIENT
Start: 2019-05-15 | End: 2019-08-26

## 2019-05-15 RX ORDER — DOXYCYCLINE 100 MG/1
100 CAPSULE ORAL 2 TIMES DAILY
Qty: 14 CAPSULE | Refills: 0 | Status: SHIPPED | OUTPATIENT
Start: 2019-05-15 | End: 2019-08-26

## 2019-05-15 ASSESSMENT — ANXIETY QUESTIONNAIRES
6. BECOMING EASILY ANNOYED OR IRRITABLE: NEARLY EVERY DAY
1. FEELING NERVOUS, ANXIOUS, OR ON EDGE: NEARLY EVERY DAY
7. FEELING AFRAID AS IF SOMETHING AWFUL MIGHT HAPPEN: SEVERAL DAYS
2. NOT BEING ABLE TO STOP OR CONTROL WORRYING: NEARLY EVERY DAY
GAD7 TOTAL SCORE: 17
3. WORRYING TOO MUCH ABOUT DIFFERENT THINGS: NEARLY EVERY DAY
5. BEING SO RESTLESS THAT IT IS HARD TO SIT STILL: SEVERAL DAYS
IF YOU CHECKED OFF ANY PROBLEMS ON THIS QUESTIONNAIRE, HOW DIFFICULT HAVE THESE PROBLEMS MADE IT FOR YOU TO DO YOUR WORK, TAKE CARE OF THINGS AT HOME, OR GET ALONG WITH OTHER PEOPLE: EXTREMELY DIFFICULT

## 2019-05-15 ASSESSMENT — MIFFLIN-ST. JEOR: SCORE: 1548.23

## 2019-05-15 ASSESSMENT — PATIENT HEALTH QUESTIONNAIRE - PHQ9
5. POOR APPETITE OR OVEREATING: NEARLY EVERY DAY
SUM OF ALL RESPONSES TO PHQ QUESTIONS 1-9: 18

## 2019-05-15 NOTE — PROGRESS NOTES
"  SUBJECTIVE:   Page Moreira is a 33 year old female who presents to clinic today for the following   health issues:      Page is here with a few concerns.    Feels like she might have thrush.  Mouth is burning and dry. Hurts whenever she eats or brushes her teeth.  Had some white plaques on her tongue, these are gone now.  Had thrush before and it felt like this.     Runny nose and cough - when I saw her on 4/11 she had a prolonged URI. It seemed like things were getting better so she didn't end up needing to fill the azithromycin.  Now she has a bad cough.  Has had post-tussive emesis.  No sinus pressure, but nose will run a lot and the nostrils keep getting scabbed and bleeding.  Very painful, nose feels dry.      Bumps in her groin for a few month.  One on the right which has been draining purulent material, but not painful.  Bumps on the left that haven't been draining but are painful.      Depression - noted that PHQ9 score is high today.  There have been a couple deaths recently and she lost her job due to her illness, so she thinks this is probably situational.  She does see a counselor.     PHQ-9 SCORE 2/27/2019 5/15/2019   PHQ-9 Total Score 17 18           Reviewed  and updated as needed this visit by clinical staff  Tobacco  Allergies  Meds  Med Hx  Surg Hx  Fam Hx  Soc Hx        Reviewed and updated as needed this visit by Provider             ROS:  Const, HEENT, pulm, skin, psych reviewed,  otherwise negative unless noted above.      OBJECTIVE:     /78 (BP Location: Right arm, Patient Position: Chair, Cuff Size: Adult Regular)   Pulse 122   Temp 98.3  F (36.8  C) (Tympanic)   Ht 1.651 m (5' 5\")   Wt 83.2 kg (183 lb 8 oz)   LMP  (LMP Unknown)   SpO2 96%   BMI 30.54 kg/m    Body mass index is 30.54 kg/m .    Gen: tired appearing, pleasant woman, no distress  HEENT: PERRL, no conjunctival injection, no posterior pharynx erythema, MMM.  TM normal b/l.  No sinus tenderness.  Nasal mucosa " scabbed anteriorly b/l.   Neck: supple, no LAD  Pulm: decreased aeration and scattered bilateral wheezing   CV: RRR, normal S1 and S2, no murmurs  Ext: 2+ radial pulses  Skin: inguinal region on the right ~1cm nontender nodule easily draining yellow/white material without overlying erythema.  Left along border of pubic hair two small nodules with mild erythema and tender.           ASSESSMENT/PLAN:       1. Wheezy bronchitis  Ongoing cough, wheezing. Will treat with doxycycline.  This should cover if there is a bacterial sinusitis contributing to the nasal drainage also.  For nose, can try antihistamine nasal spray, abx ointment, continue the saline spray and vaseline she is doing.  She will take albuterol regularly with the hopes of avoiding prednisone (she's been on this for Crohn's in the past and really doesn't like how it makes her feel) but if not significantly improving in the next couple days, will fill.   - doxycycline hyclate (VIBRAMYCIN) 100 MG capsule; Take 1 capsule (100 mg) by mouth 2 times daily for 7 days  Dispense: 14 capsule; Refill: 0  - predniSONE (DELTASONE) 20 MG tablet; Take 40 mg by mouth daily for 5 days.  Dispense: 10 tablet; Refill: 0  - albuterol (PROAIR HFA/PROVENTIL HFA/VENTOLIN HFA) 108 (90 Base) MCG/ACT inhaler; Inhale 2 puffs into the lungs every 4 hours as needed for shortness of breath / dyspnea or wheezing  Dispense: 18 g; Refill: 3    2. Thrush  No obvious thrush on exam, but can certainly try a course of nystatin   - nystatin (MYCOSTATIN) 860446 UNIT/ML suspension; Take 5 mLs (500,000 Units) by mouth 4 times daily Until symptoms resolved  Dispense: 60 mL; Refill: 1    3. Antibiotic-induced yeast infection  - fluconazole (DIFLUCAN) 150 MG tablet; Take 1 tablet (150 mg) by mouth once for 1 dose  Dispense: 1 tablet; Refill: 0    4. Local infection of skin and subcutaneous tissue  Folliculitis?  Neither nodule large enough to I&D.  Hopefully will clear up with the doxycyline.            Gisselle Jeffery MD  [facility] [place]

## 2019-05-16 ASSESSMENT — ANXIETY QUESTIONNAIRES: GAD7 TOTAL SCORE: 17

## 2019-05-29 ENCOUNTER — ALLIED HEALTH/NURSE VISIT (OUTPATIENT)
Dept: NURSING | Facility: CLINIC | Age: 34
End: 2019-05-29
Payer: COMMERCIAL

## 2019-05-29 DIAGNOSIS — Z30.42 DEPO-PROVERA CONTRACEPTIVE STATUS: Primary | ICD-10-CM

## 2019-05-29 PROCEDURE — 99207 ZZC NO CHARGE NURSE ONLY: CPT

## 2019-05-29 PROCEDURE — 96372 THER/PROPH/DIAG INJ SC/IM: CPT

## 2019-05-29 RX ADMIN — MEDROXYPROGESTERONE ACETATE 150 MG: 150 INJECTION, SUSPENSION INTRAMUSCULAR at 14:17

## 2019-08-07 ENCOUNTER — TELEPHONE (OUTPATIENT)
Dept: FAMILY MEDICINE | Facility: CLINIC | Age: 34
End: 2019-08-07

## 2019-08-07 NOTE — TELEPHONE ENCOUNTER
Pt is due now to update PHQ9.  [employer] message sent to pt. Follow up end date 10/26/19.   PHQ-9 SCORE 2/27/2019 5/15/2019   PHQ-9 Total Score 17 18     Jf BEARDEN CMA

## 2019-08-07 NOTE — LETTER
August 15, 2019      Page Moreira  [address]  [place] [address]        Dear Page,    I care about your health and have reviewed your health plan. I have reviewed your medical conditions, medication list, and lab results and am making recommendations based on this review, to better manage your health.    You are in particular need of attention regarding:  -Depression    I am recommending that you:  -Please complete the enclosed PHQ9 and return in the envelope provided    Here is a list of Health Maintenance topics that are due now or due soon:  Health Maintenance Due   Topic Date Due     Preventive Care Visit  1985     HIV Screening  06/24/2002       Please call us at [phone] (or use [employer] Search [employer]) to address the above recommendations.     Thank you for trusting [facility] and we appreciate the opportunity to serve you.  We look forward to supporting your healthcare needs in the future.    Healthy Regards,    Gisselle Jeffery MD

## 2019-08-14 NOTE — TELEPHONE ENCOUNTER
1st Attempt: Called patient to do PHQ9 screening over the phone. Left a non-detailed message and call back number [phone].     Hillary Travis RN, BSN  [facility] [place]

## 2019-08-15 NOTE — TELEPHONE ENCOUNTER
3 rd attempt to reach patient.  Left message on answering machine for patient to call back or complete [employer] message.  If she does not return call please route to team to send letter /phq9/return envelope    Jammie PageRN BSN  [facility]  [phone]

## 2019-08-15 NOTE — TELEPHONE ENCOUNTER
3rd attempt: Routing to EC team 3 to mail letter and form for PHQ9 screening.     Hillary Travis RN, BSN  [facility] [place]

## 2019-08-15 NOTE — TELEPHONE ENCOUNTER
Patient did not wish to do PHQ 9 over the phone. Patient is due for follow up with Dr. Jeffery and for her Depo.  Patient scheduled OV for 8/26/19. Denise Seals RN

## 2019-08-26 ENCOUNTER — OFFICE VISIT (OUTPATIENT)
Dept: FAMILY MEDICINE | Facility: CLINIC | Age: 34
End: 2019-08-26
Payer: COMMERCIAL

## 2019-08-26 ENCOUNTER — ANCILLARY PROCEDURE (OUTPATIENT)
Dept: GENERAL RADIOLOGY | Facility: CLINIC | Age: 34
End: 2019-08-26
Attending: INTERNAL MEDICINE
Payer: COMMERCIAL

## 2019-08-26 ENCOUNTER — TELEPHONE (OUTPATIENT)
Dept: FAMILY MEDICINE | Facility: CLINIC | Age: 34
End: 2019-08-26

## 2019-08-26 VITALS
HEART RATE: 125 BPM | BODY MASS INDEX: 32.36 KG/M2 | HEIGHT: 65 IN | SYSTOLIC BLOOD PRESSURE: 158 MMHG | DIASTOLIC BLOOD PRESSURE: 104 MMHG | TEMPERATURE: 99 F | OXYGEN SATURATION: 97 % | WEIGHT: 194.2 LBS

## 2019-08-26 DIAGNOSIS — F33.1 MODERATE EPISODE OF RECURRENT MAJOR DEPRESSIVE DISORDER (H): ICD-10-CM

## 2019-08-26 DIAGNOSIS — R05.9 COUGH: ICD-10-CM

## 2019-08-26 DIAGNOSIS — R06.2 WHEEZING: Primary | ICD-10-CM

## 2019-08-26 DIAGNOSIS — F41.1 GENERALIZED ANXIETY DISORDER: ICD-10-CM

## 2019-08-26 DIAGNOSIS — R06.2 WHEEZING: ICD-10-CM

## 2019-08-26 DIAGNOSIS — I10 ESSENTIAL HYPERTENSION: Primary | ICD-10-CM

## 2019-08-26 PROCEDURE — 99214 OFFICE O/P EST MOD 30 MIN: CPT | Mod: 25 | Performed by: INTERNAL MEDICINE

## 2019-08-26 PROCEDURE — 71046 X-RAY EXAM CHEST 2 VIEWS: CPT | Mod: FY

## 2019-08-26 PROCEDURE — 96372 THER/PROPH/DIAG INJ SC/IM: CPT | Performed by: INTERNAL MEDICINE

## 2019-08-26 RX ORDER — LISINOPRIL AND HYDROCHLOROTHIAZIDE 20; 25 MG/1; MG/1
1 TABLET ORAL DAILY
Qty: 90 TABLET | Refills: 1 | Status: SHIPPED | OUTPATIENT
Start: 2019-08-26 | End: 2020-08-31

## 2019-08-26 RX ADMIN — MEDROXYPROGESTERONE ACETATE 150 MG: 150 INJECTION, SUSPENSION INTRAMUSCULAR at 14:25

## 2019-08-26 ASSESSMENT — PATIENT HEALTH QUESTIONNAIRE - PHQ9
5. POOR APPETITE OR OVEREATING: NOT AT ALL
SUM OF ALL RESPONSES TO PHQ QUESTIONS 1-9: 8

## 2019-08-26 ASSESSMENT — ANXIETY QUESTIONNAIRES
6. BECOMING EASILY ANNOYED OR IRRITABLE: MORE THAN HALF THE DAYS
1. FEELING NERVOUS, ANXIOUS, OR ON EDGE: SEVERAL DAYS
IF YOU CHECKED OFF ANY PROBLEMS ON THIS QUESTIONNAIRE, HOW DIFFICULT HAVE THESE PROBLEMS MADE IT FOR YOU TO DO YOUR WORK, TAKE CARE OF THINGS AT HOME, OR GET ALONG WITH OTHER PEOPLE: SOMEWHAT DIFFICULT
3. WORRYING TOO MUCH ABOUT DIFFERENT THINGS: SEVERAL DAYS
2. NOT BEING ABLE TO STOP OR CONTROL WORRYING: SEVERAL DAYS
7. FEELING AFRAID AS IF SOMETHING AWFUL MIGHT HAPPEN: NOT AT ALL
GAD7 TOTAL SCORE: 6
5. BEING SO RESTLESS THAT IT IS HARD TO SIT STILL: SEVERAL DAYS

## 2019-08-26 ASSESSMENT — MIFFLIN-ST. JEOR: SCORE: 1591.77

## 2019-08-26 NOTE — TELEPHONE ENCOUNTER
Patient states that the inhaler is not covered by the patient's insurance. Please send in alternative medication.  Denise Seals RN

## 2019-08-26 NOTE — NURSING NOTE
Clinic Administered Medication Documentation      Depo Provera Documentation    Prior to injection, verified patient identity using patient's name and date of birth. Medication was administered. Please see MAR and medication order for additional information. Patient instructed to remain in clinic for 15 minutes.    BP: 158/104    LAST PAP/EXAM: No results found for: PAP  URINE HCG:not indicated    NEXT INJECTION DUE: 11/11/19 - 11/25/19    Was entire vial of medication used? Yes  Vial/Syringe: Single dose vial  Expiration Date:  6/2020  Julia Gilbert MA

## 2019-08-26 NOTE — PROGRESS NOTES
Subjective     Page Moreira is a 34 year old female who presents to clinic today for the following health issues:    HPI   Depression and Anxiety Follow-Up  Page reports her mood is doing very well.  She feels like things are going well with her current dose of sertraline.  She is also taking the naltrexone.  She has not had a drink in 3 weeks, which for her is very good.  She is seeing her counselor regularly.  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    How are you doing with your depression since your last visit? Improved seeing counselor along with medication    How are you doing with your anxiety since your last visit?  Improved seeing counselor and medication working well    Are you having other symptoms that might be associated with depression or anxiety? No    Have you had a significant life event? No     Do you have any concerns with your use of alcohol or other drugs? No      PHQ 2/27/2019 5/15/2019 8/26/2019   PHQ-9 Total Score 17 18 8   Q9: Thoughts of better off dead/self-harm past 2 weeks Not at all Not at all Not at all     KIERRA-7 SCORE 2/27/2019 5/15/2019 8/26/2019   Total Score 15 17 6         Hypertension Follow-up - ran out of her medications a week ago      Do you check your blood pressure regularly outside of the clinic? No     Are you following a low salt diet? No    Are your blood pressures ever more than 140 on the top number (systolic) OR more   than 90 on the bottom number (diastolic), for example 140/90? No     How many servings of fruits and vegetables do you eat daily?  2-3    On average, how many sweetened beverages do you drink each day (soda, juice, sweet tea, etc)?   0 - lots of water - packing lunch every day   How many days per week do you miss taking your medication? 3    What makes it hard for you to take your medications?  remembering to take      She also mentions cough and wheezing.  This is been going on for about 4 months now.  She  "has been treated with 2 courses of antibiotics and a course of steroids in May.  She does not think the prednisone or antibiotics really helped all that much.  She is using albuterol as needed and this helps temporarily.  The sinus infection symptoms and mucus are gone, but she has a very bothersome cough and feels wheezy at night.  She does smoke, which she knows does not help.  She is wondering about starting a steroid inhaler.    Reviewed and updated as needed this visit by Provider         Review of Systems   Const, HEENT, cv, pulm, psych reviewed,  otherwise negative unless noted above.         Objective    BP (!) 158/104 (BP Location: Right arm, Patient Position: Chair, Cuff Size: Adult Large)   Pulse 125   Temp 99  F (37.2  C) (Oral)   Ht 1.651 m (5' 5\")   Wt 88.1 kg (194 lb 3.2 oz)   SpO2 97%   BMI 32.32 kg/m    Body mass index is 32.32 kg/m .  Physical Exam   Gen: well appearing, pleasant woman, no distress  Pulm: breathing comfortably, Fuhs expiratory wheezes, no rales.  CV: RRR, normal S1 and S2, no murmurs  Ext: 2+ distal pulses, no LE edema    Psych: Dermal affect, linear, appropriate.    Diagnostic Test Results:  Labs reviewed in Epic  CXR - negative        Assessment & Plan     1. Essential hypertension  Blood pressure is elevated today being off of her medications for a week.  Refill ordered.  She will get restarted on the medication, and come in in 2 to 4 weeks for nurse blood pressure check and lab work.  - lisinopril-hydrochlorothiazide (PRINZIDE/ZESTORETIC) 20-25 MG tablet; Take 1 tablet by mouth daily (Patient not taking: Reported on 8/26/2019)  Dispense: 90 tablet; Refill: 1  - **Basic metabolic panel FUTURE 1yr; Future    2. Moderate episode of recurrent major depressive disorder (H)  Symptoms well controlled on current dose.  Refill ordered.  She continues to follow with a counselor.  - sertraline (ZOLOFT) 50 MG tablet; Take 1 tablet (50 mg) by mouth daily  Dispense: 90 tablet; Refill: " 1    3. Generalized anxiety disorder  - sertraline (ZOLOFT) 50 MG tablet; Take 1 tablet (50 mg) by mouth daily  Dispense: 90 tablet; Refill: 1    4. Cough  Chest x-ray is unremarkable.  She does have diffuse wheezing.  Certainly reasonable to try an inhaled corticosteroid, though since she does not recall the oral prednisone helping I am not sure if this will be effective.  Will try Flovent for a month or 2, let me know how that goes.  Will need to do PFTs at some point.  - XR Chest 2 Views; Future    5. Wheezing  As above             Return in about 1 month (around 9/26/2019) for labs and blood pressure check .    Gisselle Jeffery MD  [facility] [place]

## 2019-08-27 RX ORDER — FLUTICASONE PROPIONATE 110 UG/1
1 AEROSOL, METERED RESPIRATORY (INHALATION) 2 TIMES DAILY
Qty: 1 INHALER | Refills: 2 | Status: SHIPPED | OUTPATIENT
Start: 2019-08-27 | End: 2020-05-04

## 2019-08-27 ASSESSMENT — ANXIETY QUESTIONNAIRES: GAD7 TOTAL SCORE: 6

## 2019-08-27 NOTE — TELEPHONE ENCOUNTER
Left detailed message informing patient. Encouraged patient to call with any questions or concerns.     Dora Seaman RN   [facility] - Triage

## 2019-08-27 NOTE — TELEPHONE ENCOUNTER
Can either she or the pharmacy call to see which ICS is preferred/on formulary?     Gisselle Jeffery MD

## 2019-08-27 NOTE — TELEPHONE ENCOUNTER
Pt calling back states the arnuity ellipta inhaler is covered under insurance but requires a PA and insurance is going to fax over the PA form to be filled out.  States there is an area on the form if marked will expedite it to be done in 72 hours.      Advised pt Dr. Jeffery or the nurses at the office does not do the PAs for the clinic and there is a group of nurses off site who does the PAs who we will fax the form to.    Pt then asked about getting an inhaler today.  Advised to contact her insurance company and ask what inhaler is covered that she is able to  at a pharmacy today.  Send a my chart message to clinic with medication and pharmacy information and will send to md for filling.  Pt states understanding.    Stefanie MOTTA RN  EP Triage

## 2019-08-27 NOTE — TELEPHONE ENCOUNTER
Pt called back after s/w insurance company with 4 inhalers that are covered without needing a PA:    1) Aerospan HFA  2) Asmanex  3) Solvent  4) Pulmicort    Pharmacy pended.    Pt would like a call at [phone] ok to leave message when rx sent to pharmacy.    Stefanie MOTTA RN  EP Triage

## 2019-08-27 NOTE — TELEPHONE ENCOUNTER
Pt called back and the asmanex is also not covered. Pt states flovent will be covered and would like an rx for flovent. She is leaving town shortly so requesting this ASAP.  Please send to [facility] [address]. Thank you.  Radha Armendariz,

## 2019-08-27 NOTE — TELEPHONE ENCOUNTER
Called patient to inform her of MD Jeffery note below. Patient will be calling both insurance and pharmacy and get back to us about which inhaler would be covered. Patient states she is out of town tomorrow, so she will need this inhaler today. Will route as high priority once we hear back from patient.     Hillary Travis RN, BSN  [facility] [place]

## 2019-11-06 ENCOUNTER — HEALTH MAINTENANCE LETTER (OUTPATIENT)
Age: 34
End: 2019-11-06

## 2020-04-28 ENCOUNTER — HOSPITAL ENCOUNTER (EMERGENCY)
Facility: CLINIC | Age: 35
Discharge: HOME | End: 2020-04-28
Attending: EMERGENCY MEDICINE | Admitting: EMERGENCY MEDICINE
Payer: COMMERCIAL

## 2020-04-28 ENCOUNTER — APPOINTMENT (OUTPATIENT)
Dept: ULTRASOUND IMAGING | Facility: CLINIC | Age: 35
End: 2020-04-28
Attending: EMERGENCY MEDICINE
Payer: COMMERCIAL

## 2020-04-28 VITALS
BODY MASS INDEX: 33.32 KG/M2 | OXYGEN SATURATION: 95 % | HEIGHT: 65 IN | HEART RATE: 106 BPM | RESPIRATION RATE: 18 BRPM | DIASTOLIC BLOOD PRESSURE: 102 MMHG | TEMPERATURE: 98.5 F | WEIGHT: 200 LBS | SYSTOLIC BLOOD PRESSURE: 142 MMHG

## 2020-04-28 DIAGNOSIS — R79.89 ELEVATED LIVER FUNCTION TESTS: ICD-10-CM

## 2020-04-28 DIAGNOSIS — R10.13 ABDOMINAL PAIN, EPIGASTRIC: ICD-10-CM

## 2020-04-28 DIAGNOSIS — E87.6 HYPOKALEMIA: ICD-10-CM

## 2020-04-28 DIAGNOSIS — R74.8 ELEVATED LIPASE: ICD-10-CM

## 2020-04-28 DIAGNOSIS — Z87.19 HX OF CROHN'S DISEASE: ICD-10-CM

## 2020-04-28 DIAGNOSIS — R45.851 SUICIDAL IDEATION: ICD-10-CM

## 2020-04-28 DIAGNOSIS — F10.220 ACUTE ALCOHOLIC INTOXICATION IN ALCOHOLISM WITHOUT COMPLICATION (H): Primary | ICD-10-CM

## 2020-04-28 DIAGNOSIS — K85.20 ALCOHOL-INDUCED ACUTE PANCREATITIS, UNSPECIFIED COMPLICATION STATUS: ICD-10-CM

## 2020-04-28 LAB
ALBUMIN SERPL-MCNC: 3.9 G/DL (ref 3.4–5)
ALP SERPL-CCNC: 174 U/L (ref 40–150)
ALT SERPL W P-5'-P-CCNC: 173 U/L (ref 0–50)
AMPHETAMINES UR QL SCN: NEGATIVE
ANION GAP SERPL CALCULATED.3IONS-SCNC: 10 MMOL/L (ref 3–14)
AST SERPL W P-5'-P-CCNC: 395 U/L (ref 0–45)
BARBITURATES UR QL: NEGATIVE
BASOPHILS # BLD AUTO: 0 10E9/L (ref 0–0.2)
BASOPHILS NFR BLD AUTO: 0.5 %
BENZODIAZ UR QL: NEGATIVE
BILIRUB SERPL-MCNC: 1 MG/DL (ref 0.2–1.3)
BUN SERPL-MCNC: 8 MG/DL (ref 7–30)
CALCIUM SERPL-MCNC: 8 MG/DL (ref 8.5–10.1)
CANNABINOIDS UR QL SCN: NEGATIVE
CHLORIDE SERPL-SCNC: 103 MMOL/L (ref 94–109)
CO2 SERPL-SCNC: 26 MMOL/L (ref 20–32)
COCAINE UR QL: NEGATIVE
CREAT SERPL-MCNC: 0.65 MG/DL (ref 0.52–1.04)
DIFFERENTIAL METHOD BLD: ABNORMAL
EOSINOPHIL # BLD AUTO: 0 10E9/L (ref 0–0.7)
EOSINOPHIL NFR BLD AUTO: 0.7 %
ERYTHROCYTE [DISTWIDTH] IN BLOOD BY AUTOMATED COUNT: 12.7 % (ref 10–15)
ETHANOL SERPL-MCNC: 0.45 G/DL
GFR SERPL CREATININE-BSD FRML MDRD: >90 ML/MIN/{1.73_M2}
GLUCOSE SERPL-MCNC: 88 MG/DL (ref 70–99)
HCG SERPL QL: NEGATIVE
HCT VFR BLD AUTO: 46 % (ref 35–47)
HGB BLD-MCNC: 16.4 G/DL (ref 11.7–15.7)
IMM GRANULOCYTES # BLD: 0 10E9/L (ref 0–0.4)
IMM GRANULOCYTES NFR BLD: 0.2 %
LIPASE SERPL-CCNC: 818 U/L (ref 73–393)
LYMPHOCYTES # BLD AUTO: 2.7 10E9/L (ref 0.8–5.3)
LYMPHOCYTES NFR BLD AUTO: 48.8 %
MAGNESIUM SERPL-MCNC: 2 MG/DL (ref 1.6–2.3)
MCH RBC QN AUTO: 35.8 PG (ref 26.5–33)
MCHC RBC AUTO-ENTMCNC: 35.7 G/DL (ref 31.5–36.5)
MCV RBC AUTO: 100 FL (ref 78–100)
MONOCYTES # BLD AUTO: 0.4 10E9/L (ref 0–1.3)
MONOCYTES NFR BLD AUTO: 6.4 %
NEUTROPHILS # BLD AUTO: 2.4 10E9/L (ref 1.6–8.3)
NEUTROPHILS NFR BLD AUTO: 43.4 %
NRBC # BLD AUTO: 0 10*3/UL
NRBC BLD AUTO-RTO: 0 /100
OPIATES UR QL SCN: POSITIVE
PCP UR QL SCN: NEGATIVE
PLATELET # BLD AUTO: 75 10E9/L (ref 150–450)
POTASSIUM SERPL-SCNC: 2.9 MMOL/L (ref 3.4–5.3)
PROT SERPL-MCNC: 7.9 G/DL (ref 6.8–8.8)
RBC # BLD AUTO: 4.58 10E12/L (ref 3.8–5.2)
SODIUM SERPL-SCNC: 139 MMOL/L (ref 133–144)
WBC # BLD AUTO: 5.6 10E9/L (ref 4–11)

## 2020-04-28 PROCEDURE — 25000128 H RX IP 250 OP 636: Performed by: EMERGENCY MEDICINE

## 2020-04-28 PROCEDURE — 76705 ECHO EXAM OF ABDOMEN: CPT

## 2020-04-28 PROCEDURE — 80320 DRUG SCREEN QUANTALCOHOLS: CPT | Performed by: EMERGENCY MEDICINE

## 2020-04-28 PROCEDURE — 83690 ASSAY OF LIPASE: CPT | Performed by: EMERGENCY MEDICINE

## 2020-04-28 PROCEDURE — 83735 ASSAY OF MAGNESIUM: CPT | Performed by: EMERGENCY MEDICINE

## 2020-04-28 PROCEDURE — 25800030 ZZH RX IP 258 OP 636: Performed by: EMERGENCY MEDICINE

## 2020-04-28 PROCEDURE — 84703 CHORIONIC GONADOTROPIN ASSAY: CPT | Performed by: EMERGENCY MEDICINE

## 2020-04-28 PROCEDURE — 85025 COMPLETE CBC W/AUTO DIFF WBC: CPT | Performed by: EMERGENCY MEDICINE

## 2020-04-28 PROCEDURE — 80053 COMPREHEN METABOLIC PANEL: CPT | Performed by: EMERGENCY MEDICINE

## 2020-04-28 PROCEDURE — 80307 DRUG TEST PRSMV CHEM ANLYZR: CPT | Performed by: EMERGENCY MEDICINE

## 2020-04-28 PROCEDURE — 96365 THER/PROPH/DIAG IV INF INIT: CPT

## 2020-04-28 PROCEDURE — 25000132 ZZH RX MED GY IP 250 OP 250 PS 637: Performed by: EMERGENCY MEDICINE

## 2020-04-28 PROCEDURE — 99285 EMERGENCY DEPT VISIT HI MDM: CPT | Mod: 25

## 2020-04-28 PROCEDURE — 90791 PSYCH DIAGNOSTIC EVALUATION: CPT

## 2020-04-28 RX ORDER — ONDANSETRON 4 MG/1
4 TABLET, ORALLY DISINTEGRATING ORAL EVERY 8 HOURS PRN
Qty: 10 TABLET | Refills: 0 | Status: SHIPPED | OUTPATIENT
Start: 2020-04-28 | End: 2020-10-28

## 2020-04-28 RX ORDER — POTASSIUM CHLORIDE 1.5 G/1.58G
40 POWDER, FOR SOLUTION ORAL ONCE
Status: COMPLETED | OUTPATIENT
Start: 2020-04-28 | End: 2020-04-28

## 2020-04-28 RX ORDER — ACETAMINOPHEN 500 MG
1000 TABLET ORAL ONCE
Status: COMPLETED | OUTPATIENT
Start: 2020-04-28 | End: 2020-04-28

## 2020-04-28 RX ORDER — POTASSIUM CL/LIDO/0.9 % NACL 10MEQ/0.1L
10 INTRAVENOUS SOLUTION, PIGGYBACK (ML) INTRAVENOUS
Status: DISCONTINUED | OUTPATIENT
Start: 2020-04-28 | End: 2020-04-29 | Stop reason: HOSPADM

## 2020-04-28 RX ADMIN — POTASSIUM CHLORIDE 40 MEQ: 1.5 POWDER, FOR SOLUTION ORAL at 20:52

## 2020-04-28 RX ADMIN — Medication 10 MEQ: at 18:21

## 2020-04-28 RX ADMIN — ACETAMINOPHEN 1000 MG: 500 TABLET, FILM COATED ORAL at 20:52

## 2020-04-28 RX ADMIN — SODIUM CHLORIDE 1000 ML: 9 INJECTION, SOLUTION INTRAVENOUS at 16:07

## 2020-04-28 ASSESSMENT — ENCOUNTER SYMPTOMS
CHILLS: 0
DYSURIA: 0
SHORTNESS OF BREATH: 0
COUGH: 0
HEMATURIA: 0
HALLUCINATIONS: 0
FEVER: 0
FREQUENCY: 0
FATIGUE: 0
APPETITE CHANGE: 0
DIAPHORESIS: 0

## 2020-04-28 ASSESSMENT — MIFFLIN-ST. JEOR: SCORE: 1618.07

## 2020-04-28 NOTE — ED AVS SNAPSHOT
Emergency Department  [address] [place] [id number]  Phone:  [phone]  Fax:  [phone]                                    Page Moreira   MRN: [id number]    Department:   Emergency Department   Date of Visit:  4/28/2020           After Visit Summary Signature Page    I have received my discharge instructions, and my questions have been answered. I have discussed any challenges I see with this plan with the nurse or doctor.    ..........................................................................................................................................  Patient/Patient Representative Signature      ..........................................................................................................................................  Patient Representative Print Name and Relationship to Patient    ..................................................               ................................................  Date                                   Time    ..........................................................................................................................................  Reviewed by Signature/Title    ...................................................              ..............................................  Date                                               Time          22EPIC Rev 08/18

## 2020-04-28 NOTE — ED PROVIDER NOTES
History     Chief Complaint:  Alcohol Intoxication (pt drinking for past two months was at mothers and was concerned for pts safety so called 911 pt blew .345 for police)      HPI   Page Moreira is a 34 year old female with a history of alcoholism who presents with acute alcohol intoxication and depression. Patient states she drinks daily and had consumed approximately a 1.5 L bottle of vodka prior to her arrival. Patient's mother was concerned due to how intoxicated the patient was and called 911. Patient has a history of depression and anxiety and does tell me here that she has been feeling increasingly hopeless and wishes she would just die. It does not appear that she has an active plan at this time, but the patient is heavily intoxicated which limits our history. She endorses chronic abdominal pain in the epigastric region. She vomits once per day per her record, but she denies urinary symptoms. She denies fever and consitutional symptoms. She denies chest pain and shortness of breath. Patient denies homicidal thoughts and hallucinations.     Allergies:  No known drug allergies      Medications:    Prinzide  Depade  Zoloft     Past Medical History:    Hypertension  Alcohol dependence  Anxiety  Depo-provera contraceptive status  Crohn's disease  Depression     Past Surgical History:    History reviewed. No pertinent surgical history.     Family History:    History reviewed. No pertinent family history.      Social History:  Smoking status: Current every day smoker, PPD: 1.00  Alcohol use: Yes  Drug use: Marijuana   PCP: Gisselle Jeffrey   Presents to the ED via police  Marital Status:  Single [1]     Review of Systems   Unable to perform ROS: Mental status change (Patient heavily intoxicated)   Constitutional: Negative for appetite change, chills, diaphoresis, fatigue and fever.   Respiratory: Negative for cough and shortness of breath.    Cardiovascular: Negative for chest pain.   Genitourinary: Negative  "for dysuria, frequency, hematuria and urgency.   Psychiatric/Behavioral: Positive for suicidal ideas. Negative for hallucinations.       Physical Exam     Patient Vitals for the past 24 hrs:   BP Temp Temp src Pulse Resp SpO2 Height Weight   04/28/20 2045 -- -- -- -- -- 95 % -- --   04/28/20 2030 (!) 142/102 -- -- 106 -- 94 % -- --   04/28/20 2015 -- -- -- -- -- 94 % -- --   04/28/20 2000 -- -- -- 103 -- 95 % -- --   04/28/20 1950 -- -- -- -- -- 95 % -- --   04/28/20 1945 -- -- -- -- -- 95 % -- --   04/28/20 1940 -- -- -- -- -- 95 % -- --   04/28/20 1930 (!) 148/107 -- -- 119 -- -- -- --   04/28/20 1830 (!) 128/96 -- -- 108 -- 95 % -- --   04/28/20 1820 -- -- -- -- -- 95 % -- --   04/28/20 1810 116/75 -- -- 142 -- 94 % -- --   04/28/20 1800 -- -- -- -- -- 93 % -- --   04/28/20 1750 -- -- -- -- -- 94 % -- --   04/28/20 1740 -- -- -- -- -- 94 % -- --   04/28/20 1730 -- -- -- -- 18 94 % -- --   04/28/20 1720 -- -- -- -- -- 93 % -- --   04/28/20 1710 -- -- -- -- -- 91 % -- --   04/28/20 1700 -- -- -- -- 16 91 % -- --   04/28/20 1650 -- -- -- -- -- 93 % -- --   04/28/20 1640 -- -- -- -- 18 93 % -- --   04/28/20 1540 -- -- -- -- -- 96 % -- --   04/28/20 1538 (!) 157/126 98.5  F (36.9  C) Oral 127 20 96 % 1.651 m (5' 5\") 90.7 kg (200 lb)   04/28/20 1530 (!) 157/126 -- -- 127 -- -- -- --      Physical Exam  General: Intoxicated, appears well-developed and well-nourished. Cooperative.     In mild distress  HEENT:  Head:  Atraumatic  Ears:  External ears are normal  Mouth/Throat:  Oropharynx is without erythema or exudate and mucous membranes are dry.   Eyes:   Conjunctivae normal and EOM are normal. No scleral icterus.  CV:  Tachycardic rate, regular rhythm, normal heart sounds and radial pulses are 2+ and symmetric.  No murmur.  Resp:  Breath sounds are clear bilaterally    Non-labored, no retractions or accessory muscle use  GI:  Abdomen is soft, no distension, epigastric tenderness. No rebound or guarding.  No CVA " "tenderness bilaterally  MS:  Normal range of motion. No edema.    Normal strength in all 4 extremities.     Back atraumatic.    No midline cervical, thoracic, or lumbar tenderness  Skin:  Warm and dry.  No rash or lesions noted.  Neuro: Intoxicated. Normal strength.  GCS: 15  Psych:  Depressed mood.  Endorses suicidal thoughts and wants to \"just die.\"  No clear plan.  Denies HI.  Denies hallucinations.     Emergency Department Course   Laboratory:  Drug abuse screen 77 urine: Positive for Opiates   CBC: HGB 16.4 (H), PLT 75 (L), WNL (WBC 5.6)  CMP: Potassium 2.9 (L), Calcium 8.0 (L), Alkphos 174 (H),  (H).  (H), WNL (Creatinine 0.65)  Lipase: 818 (H)   Alcohol ethyl: 0.45 (HH)  HCG Qualitative pregnancy (blood): Negative     Interventions:  1607, NS 1L IV Bolus  1934, potassium chloride, 10 mEq, IV  2052, potassium chloride, 40 mEq, PO  2052, Tylenol, 1,000 mg, PO     Emergency Department Course:  Past medical records, nursing notes, and vitals reviewed.  1535: I performed an exam of the patient and obtained history, as documented above.     IV inserted and blood drawn.     1654: I spoke the laboratory over the phone who notified me of the patient's blood alcohol level of 0.45.     1818: I rechecked the patient. Explained findings to patient.     Virtual DEC evaluated the patient.     2125: I spoke with DEC regarding the patient.     Findings and plan explained to the Patient. Patient discharged home with instructions regarding supportive care, medications, and reasons to return. The importance of close follow-up was reviewed. The patient was prescribed Zofran     Impression & Plan    Medical Decision Making:  Patient is a 34-year-old female with a history of alcohol abuse, hypertension, and depression who presents with alcohol intoxication, and suicidal ideation.  Patient arrived significantly intoxicated with blood alcohol 0.45.  She also describes chronic epigastric abdominal discomfort and so broad " work-up was pursued.  Lab results returned concerning for mild hypokalemia with potassium of 2.9.  This was replaced with both IV potassium and oral potassium while here in the emergency department.  Patient also has LFT dysfunction consistent with likely chronic alcohol abuse.  This needs to be followed up closely in an outpatient setting with her primary care provider.  Additionally, patient's lipase is mildly elevated and I suspect she may have a component of mild chronic pancreatitis secondary to alcohol abuse.  She did not require pain medication here and was tolerating oral intake well and low concern that she would require admission for pancreatitis this evening.  We did obtain ultrasound imaging of the gallbladder, reassuringly no evidence of cholecystitis or obstructing gallstone.  There was noted hepatic steatosis but no focal hepatic mass.  Patient sobered appropriately while under my care here in the emergency department.  We did have DEC evaluate the patient due to her suicidal thoughts and ultimately found her to be reasonable for close outpatient mental health follow-up.  The most concerning issue appears to be her persistent alcohol use and need for long-term treatment.  We discussed the potential for rule 25 and seeking close outpatient follow-up with her primary care provider.  Patient additionally was given mental health resources and was able to contract for safety prior to discharge home.  After all questions answered return precautions understood, patient discharged home in care of mother.        Diagnosis:    ICD-10-CM    1. Acute alcoholic intoxication in alcoholism without complication (H)  F10.220    2. Elevated lipase  R74.8    3. Abdominal pain, epigastric  R10.13    4. Alcohol-induced acute pancreatitis, unspecified complication status  K85.20    5. Elevated liver function tests  R94.5    6. Hx of Crohn's disease  Z87.19    7. Suicidal ideation  R45.851    8. Hypokalemia  E87.6         Disposition:  Patient discharged to home with Zofran.     Discharge Medications:  Ondansetron (Zofran ODT) 4 MG ODT tab EVERY 8 HOURS PRN    Scribe Disclosure:  I, Ja Whittaker, am serving as a scribe at 3:37 PM on 4/28/2020 to document services personally performed by Harish Eckert MD based on my observations and the provider's statements to me.      Ja Whittaker  4/28/2020    EMERGENCY DEPARTMENT       Harish Eckert MD  04/29/20 1140

## 2020-04-28 NOTE — DISCHARGE INSTRUCTIONS

## 2020-04-29 NOTE — ED NOTES
Laying on cot awake. C/o headache and abd pain. MD notified.    In scrubs. AFUA in place. Video Observation initiated, patient informed.     Saroj Hammer RN

## 2020-04-29 NOTE — ED NOTES
Mothers contact information given to Dr. Araiza but pt was d/c'd prior to action being able to be taken.

## 2020-04-29 NOTE — ED NOTES
"Spoke with mother and sister explaining that I could not give any specific info about pt to them d/t privacy issues. They were concerned that pt was going to be d/c'd and said pt has no place to go (mother stated \"I don't know where she is going to go, she is not coming back here\"), no doctor and needs detox. I advised them that these things are up to the patient and we have no control about them and that as for the detox issue the doctor had deemed her safe to leave now. They wanted access to the medical information and advised to call HIM during day hours for that request. Mother wanting names of all invloved in care but just gave my first name. They wanted to talk to the doctor and were advised I would pass along the information but they may not be able to give any more information to them. I told them that , in general, pt's will need to follow up with theit family doctor for followup care.    Mother seemed agitated about situation but sister was understanding.  "

## 2020-04-30 ENCOUNTER — HOSPITAL ENCOUNTER (INPATIENT)
Facility: CLINIC | Age: 35
LOS: 1 days | Discharge: HOME | DRG: 897 | End: 2020-05-02
Attending: EMERGENCY MEDICINE | Admitting: INTERNAL MEDICINE
Payer: COMMERCIAL

## 2020-04-30 ENCOUNTER — APPOINTMENT (OUTPATIENT)
Dept: CT IMAGING | Facility: CLINIC | Age: 35
DRG: 897 | End: 2020-04-30
Attending: EMERGENCY MEDICINE
Payer: COMMERCIAL

## 2020-04-30 DIAGNOSIS — F10.930 ALCOHOL WITHDRAWAL SEIZURE WITHOUT COMPLICATION (H): ICD-10-CM

## 2020-04-30 DIAGNOSIS — R56.9 ALCOHOL WITHDRAWAL SEIZURE WITHOUT COMPLICATION (H): ICD-10-CM

## 2020-04-30 DIAGNOSIS — I10 MALIGNANT ESSENTIAL HYPERTENSION: ICD-10-CM

## 2020-04-30 LAB
ALBUMIN SERPL-MCNC: 4 G/DL (ref 3.4–5)
ALP SERPL-CCNC: 176 U/L (ref 40–150)
ALT SERPL W P-5'-P-CCNC: 142 U/L (ref 0–50)
ANION GAP SERPL CALCULATED.3IONS-SCNC: 5 MMOL/L (ref 3–14)
AST SERPL W P-5'-P-CCNC: 207 U/L (ref 0–45)
BASOPHILS # BLD AUTO: 0 10E9/L (ref 0–0.2)
BASOPHILS NFR BLD AUTO: 0.3 %
BILIRUB SERPL-MCNC: 2.1 MG/DL (ref 0.2–1.3)
BUN SERPL-MCNC: 11 MG/DL (ref 7–30)
CALCIUM SERPL-MCNC: 9.7 MG/DL (ref 8.5–10.1)
CHLORIDE SERPL-SCNC: 98 MMOL/L (ref 94–109)
CO2 SERPL-SCNC: 31 MMOL/L (ref 20–32)
CREAT SERPL-MCNC: 0.59 MG/DL (ref 0.52–1.04)
DIFFERENTIAL METHOD BLD: ABNORMAL
EOSINOPHIL # BLD AUTO: 0 10E9/L (ref 0–0.7)
EOSINOPHIL NFR BLD AUTO: 1 %
ERYTHROCYTE [DISTWIDTH] IN BLOOD BY AUTOMATED COUNT: 12.5 % (ref 10–15)
GFR SERPL CREATININE-BSD FRML MDRD: >90 ML/MIN/{1.73_M2}
GLUCOSE SERPL-MCNC: 120 MG/DL (ref 70–99)
HCT VFR BLD AUTO: 45.9 % (ref 35–47)
HGB BLD-MCNC: 16.3 G/DL (ref 11.7–15.7)
IMM GRANULOCYTES # BLD: 0 10E9/L (ref 0–0.4)
IMM GRANULOCYTES NFR BLD: 0 %
LIPASE SERPL-CCNC: 943 U/L (ref 73–393)
LYMPHOCYTES # BLD AUTO: 1.3 10E9/L (ref 0.8–5.3)
LYMPHOCYTES NFR BLD AUTO: 33.7 %
MCH RBC QN AUTO: 36.6 PG (ref 26.5–33)
MCHC RBC AUTO-ENTMCNC: 35.5 G/DL (ref 31.5–36.5)
MCV RBC AUTO: 103 FL (ref 78–100)
MONOCYTES # BLD AUTO: 0.3 10E9/L (ref 0–1.3)
MONOCYTES NFR BLD AUTO: 7 %
NEUTROPHILS # BLD AUTO: 2.3 10E9/L (ref 1.6–8.3)
NEUTROPHILS NFR BLD AUTO: 58 %
NRBC # BLD AUTO: 0 10*3/UL
NRBC BLD AUTO-RTO: 0 /100
PLATELET # BLD AUTO: 75 10E9/L (ref 150–450)
POTASSIUM SERPL-SCNC: 3.5 MMOL/L (ref 3.4–5.3)
PROT SERPL-MCNC: 8.5 G/DL (ref 6.8–8.8)
RBC # BLD AUTO: 4.45 10E12/L (ref 3.8–5.2)
SODIUM SERPL-SCNC: 134 MMOL/L (ref 133–144)
WBC # BLD AUTO: 4 10E9/L (ref 4–11)

## 2020-04-30 PROCEDURE — 96374 THER/PROPH/DIAG INJ IV PUSH: CPT

## 2020-04-30 PROCEDURE — 80053 COMPREHEN METABOLIC PANEL: CPT | Performed by: EMERGENCY MEDICINE

## 2020-04-30 PROCEDURE — 99285 EMERGENCY DEPT VISIT HI MDM: CPT | Mod: 25

## 2020-04-30 PROCEDURE — 96361 HYDRATE IV INFUSION ADD-ON: CPT

## 2020-04-30 PROCEDURE — 70450 CT HEAD/BRAIN W/O DYE: CPT

## 2020-04-30 PROCEDURE — 25800030 ZZH RX IP 258 OP 636: Performed by: EMERGENCY MEDICINE

## 2020-04-30 PROCEDURE — 83690 ASSAY OF LIPASE: CPT | Performed by: EMERGENCY MEDICINE

## 2020-04-30 PROCEDURE — 85025 COMPLETE CBC W/AUTO DIFF WBC: CPT | Performed by: EMERGENCY MEDICINE

## 2020-04-30 PROCEDURE — 80320 DRUG SCREEN QUANTALCOHOLS: CPT | Performed by: EMERGENCY MEDICINE

## 2020-04-30 PROCEDURE — 96375 TX/PRO/DX INJ NEW DRUG ADDON: CPT

## 2020-04-30 RX ORDER — PHENOBARBITAL SODIUM 130 MG/ML
130 INJECTION, SOLUTION INTRAMUSCULAR; INTRAVENOUS ONCE
Status: COMPLETED | OUTPATIENT
Start: 2020-04-30 | End: 2020-05-01

## 2020-04-30 RX ORDER — SODIUM CHLORIDE 9 MG/ML
1000 INJECTION, SOLUTION INTRAVENOUS CONTINUOUS
Status: DISCONTINUED | OUTPATIENT
Start: 2020-04-30 | End: 2020-04-30

## 2020-04-30 RX ADMIN — SODIUM CHLORIDE 1000 ML: 9 INJECTION, SOLUTION INTRAVENOUS at 22:08

## 2020-05-01 PROBLEM — F10.20 ETOH DEPENDENCE (H): Status: ACTIVE | Noted: 2020-05-01

## 2020-05-01 PROBLEM — R56.9 ALCOHOL WITHDRAWAL SEIZURE (H): Status: ACTIVE | Noted: 2020-05-01

## 2020-05-01 PROBLEM — F10.939 ALCOHOL WITHDRAWAL SEIZURE (H): Status: ACTIVE | Noted: 2020-05-01

## 2020-05-01 LAB — ETHANOL SERPL-MCNC: <0.01 G/DL

## 2020-05-01 PROCEDURE — 99207 ZZC CDG-CODE CATEGORY CHANGED: CPT | Performed by: INTERNAL MEDICINE

## 2020-05-01 PROCEDURE — 25000125 ZZHC RX 250: Performed by: INTERNAL MEDICINE

## 2020-05-01 PROCEDURE — 12000000 ZZH R&B MED SURG/OB

## 2020-05-01 PROCEDURE — 25000132 ZZH RX MED GY IP 250 OP 250 PS 637: Performed by: INTERNAL MEDICINE

## 2020-05-01 PROCEDURE — G0378 HOSPITAL OBSERVATION PER HR: HCPCS

## 2020-05-01 PROCEDURE — 25800030 ZZH RX IP 258 OP 636: Performed by: INTERNAL MEDICINE

## 2020-05-01 PROCEDURE — 99222 1ST HOSP IP/OBS MODERATE 55: CPT | Mod: AI | Performed by: INTERNAL MEDICINE

## 2020-05-01 PROCEDURE — 25000132 ZZH RX MED GY IP 250 OP 250 PS 637: Performed by: EMERGENCY MEDICINE

## 2020-05-01 PROCEDURE — HZ2ZZZZ DETOXIFICATION SERVICES FOR SUBSTANCE ABUSE TREATMENT: ICD-10-PCS | Performed by: INTERNAL MEDICINE

## 2020-05-01 PROCEDURE — 25000128 H RX IP 250 OP 636: Performed by: EMERGENCY MEDICINE

## 2020-05-01 RX ORDER — LORAZEPAM 2 MG/ML
1-2 INJECTION INTRAMUSCULAR EVERY 30 MIN PRN
Status: DISCONTINUED | OUTPATIENT
Start: 2020-05-01 | End: 2020-05-02 | Stop reason: HOSPADM

## 2020-05-01 RX ORDER — AMOXICILLIN 250 MG
2 CAPSULE ORAL 2 TIMES DAILY PRN
Status: DISCONTINUED | OUTPATIENT
Start: 2020-05-01 | End: 2020-05-02 | Stop reason: HOSPADM

## 2020-05-01 RX ORDER — LANOLIN ALCOHOL/MO/W.PET/CERES
100 CREAM (GRAM) TOPICAL DAILY
Status: DISCONTINUED | OUTPATIENT
Start: 2020-05-01 | End: 2020-05-02 | Stop reason: HOSPADM

## 2020-05-01 RX ORDER — LIDOCAINE 40 MG/G
CREAM TOPICAL
Status: DISCONTINUED | OUTPATIENT
Start: 2020-05-01 | End: 2020-05-02 | Stop reason: HOSPADM

## 2020-05-01 RX ORDER — MAGNESIUM SULFATE HEPTAHYDRATE 40 MG/ML
4 INJECTION, SOLUTION INTRAVENOUS EVERY 4 HOURS PRN
Status: DISCONTINUED | OUTPATIENT
Start: 2020-05-01 | End: 2020-05-02 | Stop reason: HOSPADM

## 2020-05-01 RX ORDER — NICOTINE 21 MG/24HR
1 PATCH, TRANSDERMAL 24 HOURS TRANSDERMAL DAILY
Status: DISCONTINUED | OUTPATIENT
Start: 2020-05-01 | End: 2020-05-02 | Stop reason: HOSPADM

## 2020-05-01 RX ORDER — POTASSIUM CHLORIDE 1.5 G/1.58G
20-40 POWDER, FOR SOLUTION ORAL
Status: DISCONTINUED | OUTPATIENT
Start: 2020-05-01 | End: 2020-05-02 | Stop reason: HOSPADM

## 2020-05-01 RX ORDER — MULTIVITAMIN,THERAPEUTIC
1 TABLET ORAL ONCE
Status: COMPLETED | OUTPATIENT
Start: 2020-05-01 | End: 2020-05-01

## 2020-05-01 RX ORDER — FOLIC ACID 1 MG/1
1 TABLET ORAL ONCE
Status: COMPLETED | OUTPATIENT
Start: 2020-05-01 | End: 2020-05-01

## 2020-05-01 RX ORDER — LISINOPRIL 20 MG/1
20 TABLET ORAL DAILY
Status: DISCONTINUED | OUTPATIENT
Start: 2020-05-01 | End: 2020-05-02 | Stop reason: HOSPADM

## 2020-05-01 RX ORDER — POTASSIUM CHLORIDE 7.45 MG/ML
10 INJECTION INTRAVENOUS
Status: DISCONTINUED | OUTPATIENT
Start: 2020-05-01 | End: 2020-05-02 | Stop reason: HOSPADM

## 2020-05-01 RX ORDER — POTASSIUM CL/LIDO/0.9 % NACL 10MEQ/0.1L
10 INTRAVENOUS SOLUTION, PIGGYBACK (ML) INTRAVENOUS
Status: DISCONTINUED | OUTPATIENT
Start: 2020-05-01 | End: 2020-05-02 | Stop reason: HOSPADM

## 2020-05-01 RX ORDER — MULTIPLE VITAMINS W/ MINERALS TAB 9MG-400MCG
1 TAB ORAL DAILY
Status: DISCONTINUED | OUTPATIENT
Start: 2020-05-01 | End: 2020-05-02 | Stop reason: HOSPADM

## 2020-05-01 RX ORDER — POLYETHYLENE GLYCOL 3350 17 G/17G
17 POWDER, FOR SOLUTION ORAL DAILY PRN
Status: DISCONTINUED | OUTPATIENT
Start: 2020-05-01 | End: 2020-05-02 | Stop reason: HOSPADM

## 2020-05-01 RX ORDER — PROCHLORPERAZINE MALEATE 10 MG
10 TABLET ORAL EVERY 6 HOURS PRN
Status: DISCONTINUED | OUTPATIENT
Start: 2020-05-01 | End: 2020-05-02 | Stop reason: HOSPADM

## 2020-05-01 RX ORDER — AMOXICILLIN 250 MG
1 CAPSULE ORAL 2 TIMES DAILY PRN
Status: DISCONTINUED | OUTPATIENT
Start: 2020-05-01 | End: 2020-05-02 | Stop reason: HOSPADM

## 2020-05-01 RX ORDER — LANOLIN ALCOHOL/MO/W.PET/CERES
100 CREAM (GRAM) TOPICAL ONCE
Status: COMPLETED | OUTPATIENT
Start: 2020-05-01 | End: 2020-05-01

## 2020-05-01 RX ORDER — NALOXONE HYDROCHLORIDE 0.4 MG/ML
.1-.4 INJECTION, SOLUTION INTRAMUSCULAR; INTRAVENOUS; SUBCUTANEOUS
Status: DISCONTINUED | OUTPATIENT
Start: 2020-05-01 | End: 2020-05-02 | Stop reason: HOSPADM

## 2020-05-01 RX ORDER — MAGNESIUM OXIDE 400 MG/1
800 TABLET ORAL ONCE
Status: COMPLETED | OUTPATIENT
Start: 2020-05-01 | End: 2020-05-01

## 2020-05-01 RX ORDER — LORAZEPAM 2 MG/ML
2 INJECTION INTRAMUSCULAR
Status: DISCONTINUED | OUTPATIENT
Start: 2020-05-01 | End: 2020-05-02 | Stop reason: HOSPADM

## 2020-05-01 RX ORDER — OXYCODONE HYDROCHLORIDE 5 MG/1
5 TABLET ORAL
Status: DISCONTINUED | OUTPATIENT
Start: 2020-05-01 | End: 2020-05-02 | Stop reason: HOSPADM

## 2020-05-01 RX ORDER — LABETALOL HYDROCHLORIDE 5 MG/ML
10 INJECTION, SOLUTION INTRAVENOUS
Status: DISCONTINUED | OUTPATIENT
Start: 2020-05-01 | End: 2020-05-02 | Stop reason: HOSPADM

## 2020-05-01 RX ORDER — ONDANSETRON 4 MG/1
4 TABLET, ORALLY DISINTEGRATING ORAL EVERY 6 HOURS PRN
Status: DISCONTINUED | OUTPATIENT
Start: 2020-05-01 | End: 2020-05-02 | Stop reason: HOSPADM

## 2020-05-01 RX ORDER — PROCHLORPERAZINE 25 MG
25 SUPPOSITORY, RECTAL RECTAL EVERY 12 HOURS PRN
Status: DISCONTINUED | OUTPATIENT
Start: 2020-05-01 | End: 2020-05-02 | Stop reason: HOSPADM

## 2020-05-01 RX ORDER — POTASSIUM CHLORIDE 1500 MG/1
20-40 TABLET, EXTENDED RELEASE ORAL
Status: DISCONTINUED | OUTPATIENT
Start: 2020-05-01 | End: 2020-05-02 | Stop reason: HOSPADM

## 2020-05-01 RX ORDER — ACETAMINOPHEN 650 MG/1
650 SUPPOSITORY RECTAL EVERY 4 HOURS PRN
Status: DISCONTINUED | OUTPATIENT
Start: 2020-05-01 | End: 2020-05-02 | Stop reason: HOSPADM

## 2020-05-01 RX ORDER — POTASSIUM CHLORIDE 29.8 MG/ML
20 INJECTION INTRAVENOUS
Status: DISCONTINUED | OUTPATIENT
Start: 2020-05-01 | End: 2020-05-02 | Stop reason: HOSPADM

## 2020-05-01 RX ORDER — LANOLIN ALCOHOL/MO/W.PET/CERES
3 CREAM (GRAM) TOPICAL
Status: DISCONTINUED | OUTPATIENT
Start: 2020-05-01 | End: 2020-05-02 | Stop reason: HOSPADM

## 2020-05-01 RX ORDER — ACETAMINOPHEN 325 MG/1
650 TABLET ORAL EVERY 4 HOURS PRN
Status: DISCONTINUED | OUTPATIENT
Start: 2020-05-01 | End: 2020-05-02 | Stop reason: HOSPADM

## 2020-05-01 RX ORDER — LORAZEPAM 2 MG/ML
1 INJECTION INTRAMUSCULAR ONCE
Status: COMPLETED | OUTPATIENT
Start: 2020-05-01 | End: 2020-05-01

## 2020-05-01 RX ORDER — LISINOPRIL AND HYDROCHLOROTHIAZIDE 20; 25 MG/1; MG/1
1 TABLET ORAL ONCE
Status: COMPLETED | OUTPATIENT
Start: 2020-05-01 | End: 2020-05-01

## 2020-05-01 RX ORDER — FOLIC ACID 1 MG/1
1 TABLET ORAL DAILY
Status: DISCONTINUED | OUTPATIENT
Start: 2020-05-01 | End: 2020-05-02 | Stop reason: HOSPADM

## 2020-05-01 RX ORDER — BISACODYL 10 MG
10 SUPPOSITORY, RECTAL RECTAL DAILY PRN
Status: DISCONTINUED | OUTPATIENT
Start: 2020-05-01 | End: 2020-05-02 | Stop reason: HOSPADM

## 2020-05-01 RX ORDER — ONDANSETRON 2 MG/ML
4 INJECTION INTRAMUSCULAR; INTRAVENOUS EVERY 6 HOURS PRN
Status: DISCONTINUED | OUTPATIENT
Start: 2020-05-01 | End: 2020-05-02 | Stop reason: HOSPADM

## 2020-05-01 RX ORDER — LORAZEPAM 0.5 MG/1
1-2 TABLET ORAL EVERY 30 MIN PRN
Status: DISCONTINUED | OUTPATIENT
Start: 2020-05-01 | End: 2020-05-02 | Stop reason: HOSPADM

## 2020-05-01 RX ADMIN — NICOTINE 1 PATCH: 14 PATCH, EXTENDED RELEASE TRANSDERMAL at 10:14

## 2020-05-01 RX ADMIN — THIAMINE HCL TAB 100 MG 100 MG: 100 TAB at 09:35

## 2020-05-01 RX ADMIN — FOLIC ACID 1 MG: 1 TABLET ORAL at 00:28

## 2020-05-01 RX ADMIN — LORAZEPAM 1 MG: 2 INJECTION INTRAMUSCULAR; INTRAVENOUS at 00:30

## 2020-05-01 RX ADMIN — THERA TABS 1 TABLET: TAB at 00:28

## 2020-05-01 RX ADMIN — SODIUM CHLORIDE 1000 ML: 9 INJECTION, SOLUTION INTRAVENOUS at 02:41

## 2020-05-01 RX ADMIN — LISINOPRIL 20 MG: 20 TABLET ORAL at 09:35

## 2020-05-01 RX ADMIN — Medication: at 05:55

## 2020-05-01 RX ADMIN — Medication 800 MG: at 00:29

## 2020-05-01 RX ADMIN — PHENOBARBITAL SODIUM 130 MG: 130 INJECTION INTRAMUSCULAR; INTRAVENOUS at 00:02

## 2020-05-01 RX ADMIN — Medication 100 MG: at 00:28

## 2020-05-01 RX ADMIN — FOLIC ACID 1 MG: 1 TABLET ORAL at 09:35

## 2020-05-01 RX ADMIN — MULTIPLE VITAMINS W/ MINERALS TAB 1 TABLET: TAB at 09:35

## 2020-05-01 RX ADMIN — LISINOPRIL AND HYDROCHLOROTHIAZIDE 1 TABLET: 25; 20 TABLET ORAL at 01:17

## 2020-05-01 ASSESSMENT — ACTIVITIES OF DAILY LIVING (ADL)
ADLS_ACUITY_SCORE: 8

## 2020-05-01 NOTE — PROGRESS NOTES
RECEIVING UNIT ED HANDOFF REVIEW    ED Nurse Handoff Report was reviewed by: Kathy Hyatt RN on May 1, 2020 at 1:03 AM

## 2020-05-01 NOTE — PLAN OF CARE
Pt up IND in room.  Awaiting Neuro & Chem Dep consults.  CIWA scores of 1, Q4 hour neuros intact.  Seizure pcns.

## 2020-05-01 NOTE — PLAN OF CARE
A&Ox4. Afebrile. Tachycardic and hypertensive. O2 maintained on room air. Tele: NSR. Neuros intact. CIWA scores 1,0. Regular diet. Up independently in room. CMS intact. Voiding adequately in bathroom. IV saline locked.

## 2020-05-01 NOTE — ED NOTES
[facility]  ED Nurse Handoff Report    ED Chief complaint: Seizures      ED Diagnosis:   Final diagnoses:   Alcohol withdrawal seizure without complication (H)   Malignant essential hypertension       Code Status: to be addressed by admitting doctor    Allergies: No Known Allergies    Patient Story: recently seen in ED for ETOH intoxication. Pt was at home described seeing an aura then had a witnessed seizure.pt states did bite tongue and chipped tooth.   Focused Assessment:  Pt alert, feels flushed. Hypertensive and HR races with activity of sitting up. Denies any aura at this time. B/P 170/130 MD is aware and pt medicated per Dr order. HR sinus tach. Denies hallucinations at this time but reports feet are flushed and feel hot.     Treatments and/or interventions provided: IV Phenobarbitol, IV NS bolus.   Patient's response to treatments and/or interventions: Pt tolerating well    To be done/followed up on inpatient unit:  unknown    Does this patient have any cognitive concerns?: none    Activity level - Baseline/Home:  Stand with Assist  Activity Level - Current:   Stand with Assist    Patient's Preferred language: English   Needed?: No    Isolation: None  Infection: Not Applicable  Bariatric?: No    Vital Signs:   Vitals:    04/30/20 2300 05/01/20 0000 05/01/20 0001 05/01/20 0004   BP: (!) 184/131 (!) 173/131     Pulse: 105 102     Resp: 24 16 22 16   Temp:       TempSrc:       SpO2: 98% 99% 99% 100%       Cardiac Rhythm:     Was the PSS-3 completed:   Yes  What interventions are required if any?               Family Comments: Mother at home and in contact with pt.  OBS brochure/video discussed/provided to patient/family: Yes              Name of person given brochure if not patient: yes              Relationship to patient: yes    For the majority of the shift this patient's behavior was Green.   Behavioral interventions performed were none.    ED NURSE PHONE NUMBER: *[phone]

## 2020-05-01 NOTE — H&P
[facility]    History and Physical - Hospitalist Service       Date of Admission:  4/30/2020    Assessment & Plan   Page Moreira is a 34 year old female admitted on 4/30/2020. She presents to the emergency department approximately 72 hours after her last alcoholic drink with witnessed seizure-like activity and a small tongue hematoma concerning for first-time alcohol withdrawal seizure.    Alcohol dependence with acute alcohol withdrawal:  Patient with an aura followed by seizure-like activity witnessed by mother approximately 72 hours after last alcoholic drink.  Head CT without overt cause for seizure.  History of alcohol withdrawal in the past without seizure.  -Seizure precautions with IV Ativan available for recurrent seizure activity  -1 L LR bolus now  -Daily thiamine, folate, multivitamin  -Patient counseled on no driving until seizure-free for 3 months, seizure episode reporting to [facility]  -Alcohol cessation.  Discussed with patient on admission.  She has a plan in place including limiting her access to both debit card/credit card and car keys by having her mother hold onto these items, getting back with an AA support group.  -CIWA scale with PRN Ativan  -Patient declines psychiatry and chemical dependency consultation.  Alcohol withdrawal seizure, likely:  -Most likely represents alcohol withdrawal seizure.  I have not ordered MRI in this setting, though can be pursued if recommended by neurology.  -Neurology consult given first time seizure event; appreciate recommendations re: need for EEG or MRI (also re: need to continue holding zoloft)  -Received phenobarbital in the emergency department.  I have not continued antiepileptic therapies in the setting of alcohol withdrawal seizure, though will receive Ativan as needed for alcohol withdrawal    Steatohepatitis: Elevated LFTs with right upper quadrant abdominal ultrasound 4/28 demonstrating fatty infiltration of the liver without  focal hepatic mass.  -Weight loss as able  -Alcohol cessation  -Follow-up CMP as an outpatient with prolonged sobriety    Hypertension:  -Lisinopril 20 mg daily to continue  -Holding prior to admission hydrochlorothiazide component of Zestoretic given orthostatic tachycardia  -Treatment of alcohol withdrawal as above  -PRN IV labetalol is available if needed for systolic blood pressure greater than 180    Depression:  -Prior to admission Zoloft on hold given SSRIs can be associated with seizure.  As above, most likely represents alcohol withdrawal seizure, though will await neurology recommendations regarding risk of continuing.    Tobacco dependence:  -Smoking cessation recommended    Crohn's disease: Not currently on medications for Crohn's.  History of anal fistulas, history of end ilectomy.  Does not currently appear to be in a flare.   -Outpatient GI follow-up; discussed with patient.        Diet: Regular.diet as tolerated  DVT Prophylaxis: Ambulate every shift  Black Catheter: not present  Code Status: Full code    Disposition Plan   Expected discharge: Tomorrow, recommended to prior living arrangement once Alcohol withdrawal resolved and not requiring benzodiazepines, neurology evaluation complete, no further seizure-like activity.  Entered: Phi Purdy MD 05/01/2020, 1:41 AM     The patient's care was discussed with the Patient and Dr. Trierweiler in the emergency department.    Phi Purdy MD  [facility]    ______________________________________________________________________    Chief Complaint   Witnessed seizure-like activity    History is obtained from patient, chart review, review of outside records including 2008 small bowel barium follow-through with distal ileal stricture    History of Present Illness   Page Moreira is a 34 year old female who presents to the emergency department after a witnessed episode of seizure-like activity.    Patient has a history of alcohol  dependence and has been drinking approximately 1 L of hard alcohol daily up until 4/28/2020.  At that point, patient's mother, with whom she lives, was concerned with her level of intoxication and patient was transported to the emergency department via police.  Blood alcohol at that time of 0.45 with mild hypokalemia at 2.9.  Significant abnormalities in LFTs for which a right upper quadrant ultrasound was performed and demonstrated steatohepatitis.  Patient was able to contract for safety, became sober in the emergency department, and was discharged.  Patient reports that she went to her aunt's house where she had alcohol withdrawal shakes 4/29/2020.  Describes whole body shaking consistent with her prior episodes of alcohol withdrawal, largely stayed in bed all day and had no appetite consistent again with her alcohol withdrawal.  She began to feel better 4/30, went back to her mother's house, and started eating again.  In the evening, patient was attempting to read something when she had an aura with a bright spot in her vision interrupting her ability to read.  She went upstairs to ask her mother about it, as her mother is a nurse, and attempted to describe what was happening.  She recalls trying to tell her mother what was happening and her mother letting her know that her speech was garbled.  Patient subsequently has no recollection of events preceding emergency department, though reportedly was seen to have tonic-clonic activity concerning for seizure.  This lasted approximately 2 minutes per report.    Patient was brought to the emergency department where she received phenobarbital, a head CT was performed demonstrating no acute intracranial process.  Patient has no prior history of alcohol withdrawal seizures, though it was thought that she was experiencing her first alcohol withdrawal seizure.  Patient also agrees with this assessment.  She has gone through withdrawal many times in the past, though never  had similar seizure-like activity.  She has a slight tongue hematoma on the right posterior tongue from a tongue bite.  She is currently neurologically intact.  Very minimal tremulousness of distal upper extremities, though hypertensive and tachycardic beyond her baseline concerning for alcohol withdrawal.  Patient tells me that her typical alcohol withdrawal is usually drinking day 0, shaking and anorexia day 1, feeling better day 2, not dissimilar to what she describes prior to presentation tonight.    As above, patient declines chemical dependency or psychiatry consultations.  She tells me that she has a plan for staying sober when she returns home including having her mother restrict her access to credit cards and debit cards as well as her car keys.      Review of Systems    The 10 point Review of Systems is negative other than noted in the HPI or here.  No fevers or chills  Reports orange urine, likely related to elevated bilirubin  Feels that her depression and anxiety are well treated currently    Past Medical History    I have reviewed this patient's medical history and updated it with pertinent information if needed.   Past Medical History:   Diagnosis Date     Anxiety      Crohn disease (H)      ETOH abuse      Hypertension      Substance abuse (H)        Past Surgical History   I have reviewed this patient's surgical history and updated it with pertinent information if needed.  Past Surgical History:   Procedure Laterality Date     COLONOSCOPY       ORTHOPEDIC SURGERY     Appendectomy in 2008  Perianal abscess drained in 2009  [place] teeth extraction  Fistulotomy 2010    Social History   I have reviewed this patient's social history and updated it with pertinent information if needed.  Social History     Tobacco Use     Smoking status: Current Every Day Smoker     Packs/day: 1.00     Types: Cigarettes     Smokeless tobacco: Never Used   Substance Use Topics     Alcohol use: Yes     Comment: 1.75 l q 2  days     Drug use: Not Currently     Types: Marijuana       Family History   I have reviewed this patient's family history and updated it with pertinent information if needed.   Paternal grandfather w/ colitis (late onset; pt uncertain type of colitis, but seems less likely IBD)  No family history of crohns or UC    Prior to Admission Medications   Prior to Admission Medications   Prescriptions Last Dose Informant Patient Reported? Taking?   albuterol (PROAIR HFA/PROVENTIL HFA/VENTOLIN HFA) 108 (90 Base) MCG/ACT inhaler   No No   Sig: Inhale 2 puffs into the lungs every 4 hours as needed for shortness of breath / dyspnea or wheezing   fluticasone (FLOVENT HFA) 110 MCG/ACT inhaler   No No   Sig: Inhale 1 puff into the lungs 2 times daily   lisinopril-hydrochlorothiazide (PRINZIDE/ZESTORETIC) 20-25 MG tablet   No No   Sig: Take 1 tablet by mouth daily   Patient not taking: Reported on 8/26/2019   naltrexone (DEPADE/REVIA) 50 MG tablet   No No   Sig: Take 1 tablet (50 mg) by mouth daily   ondansetron (ZOFRAN ODT) 4 MG ODT tab   No No   Sig: Take 1 tablet (4 mg) by mouth every 8 hours as needed for nausea   sertraline (ZOLOFT) 50 MG tablet   No No   Sig: Take 1 tablet (50 mg) by mouth daily      Facility-Administered Medications Last Administration Doses Remaining   medroxyPROGESTERone (DEPO-PROVERA) injection 150 mg 8/26/2019  2:25 PM         Allergies   No Known Allergies    Physical Exam   Vital Signs: Temp: 97.5  F (36.4  C) Temp src: Oral BP: (!) 162/110 Pulse: 115 Heart Rate: 98 Resp: 16 SpO2: 98 % O2 Device: None (Room air)    Weight: 0 lbs 0 oz    General Appearance: Obese 34-year-old female with slightly luis angel complexion laying comfortably in bed  Eyes: Pupils are dilated following Ativan dosing in the emergency department, though patient is not sedate.  No significant scleral icterus.  Mild scleral injection more notable on right as compared to left  HEENT: Normocephalic, small right lateral hematoma involving  the posterior aspect of the tongue from bite injury  Respiratory: Breath sounds clear bilaterally to auscultation with good inspiratory effort.  No wheezing or crackles.  1 loose rattling cough during interview which patient reports is consistent with her smoker's cough and unchanged from baseline  Cardiovascular: Tachycardia to the 120 range.  No appreciable murmur.  Regular rhythm  GI: Abdomen obese.  Bowel sounds normoactive.  Mild diffuse tenderness more notably suprapubically as well as right upper quadrant.  Lymph/Hematologic: No lower extremity edema  Skin: Right olecranon with healing injury.  No bursitis  Musculoskeletal: Range of motion right elbow intact.  Muscular tone intact in all extremities.  Neurologic: Very minimal fine tremulousness of distal upper extremities on left.  Alert, conversant.  Mental status grossly intact.  Psychiatric: Normal affect, very pleasant    Data   Data reviewed today: I reviewed all medications, new labs and imaging results over the last 24 hours. I personally reviewed no images or EKG's today.    Recent Labs   Lab 04/30/20  2207 04/28/20  1607   WBC 4.0 5.6   HGB 16.3* 16.4*   * 100   PLT 75* 75*    139   POTASSIUM 3.5 2.9*   CHLORIDE 98 103   CO2 31 26   BUN 11 8   CR 0.59 0.65   ANIONGAP 5 10   SANTOS 9.7 8.0*   * 88   ALBUMIN 4.0 3.9   PROTTOTAL 8.5 7.9   BILITOTAL 2.1* 1.0   ALKPHOS 176* 174*   * 173*   * 395*   LIPASE 943* 818*

## 2020-05-01 NOTE — ED PROVIDER NOTES
"  History     Chief Complaint:  Seizures     HPI   Page Moreira is a 34 year old female with a history of alcoholism who presents after suffering a first-time seizure.  Patient was seen in this emergency department 3 days ago with an alcohol level of 0.45.  She has not drank alcohol since then.  She notes she has been through withdrawal several times before but is never suffered a seizure.  She notes she typically will feel very shaky on day 1 followed by feeling \"out of it and uncoordinated\" on day 2.  However, by day 3, she notes she is typically back to her baseline.  She notes this was a similar progression over the past 3 days and she states that she felt well this morning.  She ate and drank normally throughout the day.  She has abstained from alcohol.  She denies any other unusual symptoms.    However, approximately an hour and a half before her visit to the emergency department, she found she was experiencing what seems to be consistent with a scotoma with swirling and shimmering vision to the far right side of her visual field.  There was no associated pain with this.  She went up her stairs to tell her mother about it.  She then had difficulty speaking and then collapsed to the floor and suffered a 2-minute tonic-clonic seizure with a postictal phase.  She notes she did bite her tongue.  EMS was called and she was brought to the emergency department during which time she has cleared appropriately, back to her baseline at the time of my history gathering.  At this point, she has no complaints.  She describes a very mild pressure in her head.  She notes that the right side of her tongue is sore from biting it.  She does not feel shaky or weak.  She denies chest pain or shortness of breath.    Finally, the patient has a known history of hypertension.  However, she has had noncompliance with her medicine regimen due to lack of insurance.  She notes she had been well controlled on lisinopril/hydrochlorothiazide. "  Despite her elevated blood pressure today, she denies chest pain, shortness of breath, syncope, other vision changes, focal weakness or numbness, etc.      Allergies:  NKDA     Medications:    Albuterol  Lisinopril  Zoloft  Naltrexone    Past Medical History:    HTN  Depression  Alcohol dependence  Anxiety  Crohn's disease    Past Surgical History:    Orthopedic surgery     Family History:    No past pertinent family history.    Social History:  Presents alone.  Current every day smoker, 1 ppd.  Positive for alcohol use.   Marital Status:  Single [1]     Review of Systems  Pertinent positives and negatives as above.  A 14-point review of systems was performed with all other systems reviewed as negative.      Physical Exam     Patient Vitals for the past 24 hrs:   BP Temp Temp src Pulse Heart Rate Resp SpO2   05/01/20 0132 (!) 162/110 97.5  F (36.4  C) Oral 115 -- 16 --   05/01/20 0111 (!) 143/95 -- -- 113 -- 16 98 %   05/01/20 0004 -- -- -- -- 98 16 100 %   05/01/20 0001 -- -- -- -- 101 22 99 %   05/01/20 0000 (!) 173/131 -- -- 102 101 16 99 %   04/30/20 2300 (!) 184/131 -- -- 105 112 24 98 %   04/30/20 2251 -- -- -- -- 93 30 100 %   04/30/20 2242 -- -- -- -- 97 22 100 %   04/30/20 2236 -- -- -- -- 101 -- 98 %   04/30/20 2227 -- -- -- -- 108 13 100 %   04/30/20 2219 -- -- -- -- 100 30 99 %   04/30/20 2214 -- -- -- -- -- 19 --   04/30/20 2211 -- -- -- -- 105 -- 95 %   04/30/20 2159 -- -- -- -- 108 24 --   04/30/20 2150 -- 99.4  F (37.4  C) Oral 110 -- 17 97 %     Physical Exam  Eye:  Pupils are equal, round, and reactive.  Extraocular movements intact.    ENT:  No rhinorrhea.  Moist mucus membranes.  There is a superficial laceration to the right side of the tongue without active bleeding.  Normal tonsils.    Cardiac:  Regular rate and rhythm.  No murmurs, gallops, or rubs.    Pulmonary:  Clear to auscultation bilaterally.  No wheezes, rales, or rhonchi.    Abdomen:  Positive bowel sounds.  Abdomen is soft and  non-distended, without focal tenderness.    Musculoskeletal:  Normal movement of all extremities without evidence for deficit.    Skin:  Warm and dry without rashes.    Neurologic:  CN II - XII intact.  5/5 strength in all extremities.  Normal sensation throughout.  Normal finger to nose and heel to shin.  2+ patellar reflexes.  Normal gait.    Psychiatric:  Normal affect with appropriate interaction with examiner.      Emergency Department Course     Imaging:  Radiology findings were communicated with the patient who voiced understanding of the findings.    CT Head without contrast:   IMPRESSION:   No definite acute intracranial abnormality. If the patient is experiencing an acute, focal, or ongoing neurologic deficit, or if the patient's seizures persist, an MRI may be indicated.   A small focus of hypodensity involving the right inferior basal ganglia likely reflects a prominent perivascular space, however, an age-indeterminate lacunar type infarction could have a similar appearance  As per radiology.    Laboratory:  Laboratory findings were communicated with the patient who voiced understanding of the findings.    CBC: WBC: 4.0, HGB: 16.3 (H), PLT: 75 (L)  CMP: Glucose 120 (H), Bilirubin 2.1 (H), Alkaline Phosphatase 176 (H),  (H),  (H), o/w WNL (Creatinine: 0.59)    Lipase: 943 (H)    Interventions:  2208 NS 1L IV Bolus  0002 Luminal 130 mg IV  Medications   LORazepam (ATIVAN) injection 2 mg (has no administration in time range)   0.9% sodium chloride BOLUS (0 mLs Intravenous Stopped 5/1/20 0019)   PHENobarbital (LUMINAL) injection 130 mg (130 mg Intravenous Given 5/1/20 0002)   lisinopril-hydrochlorothiazide (ZESTORETIC) 20-25 MG per tablet 1 tablet (1 tablet Oral Given 5/1/20 0117)   multivitamin, therapeutic (THERA-VIT) tablet 1 tablet (1 tablet Oral Given 5/1/20 0028)   folic acid (FOLVITE) tablet 1 mg (1 mg Oral Given 5/1/20 0028)   vitamin B1 (THIAMINE) tablet 100 mg (100 mg Oral Given  5/1/20 0028)   magnesium oxide (MAG-OX) tablet 800 mg (800 mg Oral Given 5/1/20 0029)   LORazepam (ATIVAN) injection 1 mg (1 mg Intravenous Given 5/1/20 0030)       Emergency Department Course:  Past medical records, nursing notes, and vitals reviewed.    2215 I performed an exam of the patient as documented above.     IV was inserted and blood was drawn for laboratory testing, results above.    The patient was sent for a CT Head while in the emergency department, results above.     0000  I rechecked the patient and discussed the results of her workup thus far.     Findings and plan explained to the Patient who consents to admission.     0009: Discussed the patient with Dr. Purdy, who will admit the patient to a SUDS bed for further monitoring, evaluation, and treatment.     Impression & Plan     Medical Decision Making:  Page Moreira is a 34 year old female who presents to us with a seizure.  Considering her alcohol history and this occurring 2 to 3 days after her last drink, I believe it is still associated with alcohol withdrawal.  She is tachycardic here.  She is significantly hypertensive.  However, she shows no shakes or tongue fasciculations on exam.  She was given a dose of phenobarbital to help with her withdrawal.  She was also given a dose of lorazepam and her oral dose of her blood pressure medications.  This resulted in a downward trend of her blood pressures which were concerning the elevated in the 180s over 130s.  Considering she is at high risk for having another withdrawal seizure not to mention concerns for her leaving the department with such elevated blood pressures, I have strongly advised her to be admitted to the hospital.  After significant discussion, she has agreed to be admitted.  I spoke with Dr. Purdy of the hospitalist service who will admit the patient to our withdrawal unit for close watch of her blood pressures and neuro exam.  I do not believe that she needs to be loaded with an  antiepileptic as I do believe this is likely withdrawal related.  Head CT is performed which does not show any obvious tumor or mass.  There are some incidental findings which may require further work-up by the admitting team.    Diagnosis:    ICD-10-CM    1. Alcohol withdrawal seizure without complication (H)  F10.230 Alcohol ethyl     Alcohol ethyl     CANCELED: Alcohol level blood   2. Malignant essential hypertension  I10        Disposition:  Admitted to hospitlaist    Scribe Disclosure:  I, Ray Stallings, am serving as a scribe at 10:08 PM on 4/30/2020 to document services personally performed by Trierweiler, Chad A, MD based on my observations and the provider's statements to me.      EMERGENCY DEPARTMENT     Trierweiler, Chad A, MD  05/01/20 3016

## 2020-05-01 NOTE — PROGRESS NOTES
Non billing PN    H and P reviewed    No further seizure activity    Ordered nicotine patch per patient's request    Awaiting neurology consultation    See earlier H and P for inpatient plan.        Mehul Solano MD

## 2020-05-01 NOTE — ED TRIAGE NOTES
"Recent ED visit for alcohol intoxication. Got home today, no withdrawals symptoms. Had a witnessed seizure, describes aura before seizing. Duration of 2 mins, \"body shaking all over\" per mother. EMS arrived to the scene when patient was post ictal.   "

## 2020-05-01 NOTE — PHARMACY-ADMISSION MEDICATION HISTORY
Pharmacy Medication History  Admission medication history interview status for the 4/30/2020  admission is complete. See EPIC admission navigator for prior to admission medications     Medication history sources: Patient, sallie  Medication history source reliability: Moderate  Adherence assessment: insurance lapsed, so has been unable to receive lisinopril/hctz, zoloft, and depo-provera    Significant changes made to the medication list:  none      Additional medication history information:   Needs to be restarted on depo-provera, lisinopril/hctz, and sertraline but has been unable due to insurance issues. Sertraline and lisinopril/hctz confirmed with sallie.    Medication reconciliation completed by provider prior to medication history? No    Time spent in this activity: 30 minutes      Prior to Admission medications    Medication Sig Last Dose Taking? Auth Provider   albuterol (PROAIR HFA/PROVENTIL HFA/VENTOLIN HFA) 108 (90 Base) MCG/ACT inhaler Inhale 2 puffs into the lungs every 4 hours as needed for shortness of breath / dyspnea or wheezing  Yes Gisselle Jeffery MD   fluticasone (FLOVENT HFA) 110 MCG/ACT inhaler Inhale 1 puff into the lungs 2 times daily  Yes Gisselle Jeffery MD   naltrexone (DEPADE/REVIA) 50 MG tablet Take 1 tablet (50 mg) by mouth daily  Yes Gisselle Jeffery MD   ondansetron (ZOFRAN ODT) 4 MG ODT tab Take 1 tablet (4 mg) by mouth every 8 hours as needed for nausea  Yes Harish Eckert MD   lisinopril-hydrochlorothiazide (PRINZIDE/ZESTORETIC) 20-25 MG tablet Take 1 tablet by mouth daily  Patient not taking: Reported on 8/26/2019 More than a month at Unknown time  Gisselle Jeffery MD   sertraline (ZOLOFT) 50 MG tablet Take 1 tablet (50 mg) by mouth daily More than a month at Unknown time  Gisselle Jeffery MD

## 2020-05-01 NOTE — PROVIDER NOTIFICATION
MD Notification    Notified Person: MD    Notified Person Name: Purdy    Notification Date/Time: 5/1/20 0340    Notification Interaction: Text page    Purpose of Notification: FYI orthostatic positive. HR increase from 105 to 130 while standing.    Orders Received: No new orders    Comments:

## 2020-05-01 NOTE — PROVIDER NOTIFICATION
MD Notification    Notified Person: MD    Notified Person Name: Purdy    Notification Date/Time: 5/1/20 0250    Notification Interaction: Text page    Purpose of Notification: Pt requesting orajel or similar product for tongue pain    Orders Received: Orajel ordered    Comments:

## 2020-05-01 NOTE — ED NOTES
Bed: ED06  Expected date: 4/30/20  Expected time:   Means of arrival:   Comments:  Hems 435 45f withdrawal sz.2130ish

## 2020-05-01 NOTE — CONSULTS
[facility] [place]  [facility]    Neurology Consultation    Page Moreira MRN# [id number]   YOB: 1985 Age: 34 year old    Code Status:Full Code   Date of Admission: 4/30/2020  Date of Consult:05/01/2020                                                                                       Assessment and Plan:                                         #. Alcohol withdrawal seizure  --manage per usual withdrawal protocol.  No indication for anticonvulsant therapy at this time.  Discussed option to pursue further workup with MRI/EEG but would be low yield given likelihood that from etoh withdrawal.  Patient was given my card for follow up as outpatient if needed.  Discussed need to not drive for 3 months according to [place] guidelines.    Please contact General Neurology service if questions or follow up needed.   We will sign off        ----------------------------------------------------------------------------------  ----------------------------------------------------------------------------------  Reason for consult: I was asked by Dr. purdy to evaluate this patient for seizure.       Chief Complaint:   Seizure  History is obtained from the patient / chart       History of Present Illness:   This patient is a 34 year old female who presents with history per Dr. Purdy admission note.:  Page Moreira is a 34 year old female who presents to the emergency department after a witnessed episode of seizure-like activity.     Patient has a history of alcohol dependence and has been drinking approximately 1 L of hard alcohol daily up until 4/28/2020.  At that point, patient's mother, with whom she lives, was concerned with her level of intoxication and patient was transported to the emergency department via police.  Blood alcohol at that time of 0.45 with mild hypokalemia at 2.9.  Significant abnormalities in LFTs for which a right upper quadrant ultrasound was performed and  demonstrated steatohepatitis.  Patient was able to contract for safety, became sober in the emergency department, and was discharged.  Patient reports that she went to her aunt's house where she had alcohol withdrawal shakes 4/29/2020.  Describes whole body shaking consistent with her prior episodes of alcohol withdrawal, largely stayed in bed all day and had no appetite consistent again with her alcohol withdrawal.  She began to feel better 4/30, went back to her mother's house, and started eating again.  In the evening, patient was attempting to read something when she had an aura with a bright spot in her vision interrupting her ability to read.  She went upstairs to ask her mother about it, as her mother is a nurse, and attempted to describe what was happening.  She recalls trying to tell her mother what was happening and her mother letting her know that her speech was garbled.  Patient subsequently has no recollection of events preceding emergency department, though reportedly was seen to have tonic-clonic activity concerning for seizure.  This lasted approximately 2 minutes per report.     Patient reports that she stopped drinking Tuesday night.  She did have the shakes all day Wednesday and felt fine yesterday Thursday morning.  In the evening she started to notice of scotoma in the right visual field.  As she went to talk to her mother about it, she started to feel lightheaded and was subsequently noted to have a generalized seizure.  There was some associated tongue trauma.  She remembers the paramedics arriving and most of the other events of the evening although some recollection was limited.  She has never had a seizure before in the past.  There is no family history of seizures.Overnight, patient has not experienced any further seizure.She feels back to normal baseline.       Past Medical History:     Past Medical History:   Diagnosis Date     Anxiety      Crohn disease (H)      ETOH abuse       Hypertension      Substance abuse (H)          Past Surgical History:     Past Surgical History:   Procedure Laterality Date     COLONOSCOPY       ORTHOPEDIC SURGERY            Social History:     Social History     Socioeconomic History     Marital status: Single     Spouse name: None     Number of children: None     Years of education: None     Highest education level: None   Occupational History     None   Social Needs     Financial resource strain: None     Food insecurity     Worry: None     Inability: None     Transportation needs     Medical: None     Non-medical: None   Tobacco Use     Smoking status: Current Every Day Smoker     Packs/day: 1.00     Types: Cigarettes     Smokeless tobacco: Never Used   Substance and Sexual Activity     Alcohol use: Yes     Comment: 1.75 l q 2 days     Drug use: Not Currently     Types: Marijuana     Sexual activity: Yes     Partners: Male   Lifestyle     Physical activity     Days per week: None     Minutes per session: None     Stress: None   Relationships     Social connections     Talks on phone: None     Gets together: None     Attends [employer] witness service: None     Active member of club or organization: None     Attends meetings of clubs or organizations: None     Relationship status: None     Intimate partner violence     Fear of current or ex partner: None     Emotionally abused: None     Physically abused: None     Forced sexual activity: None   Other Topics Concern     None   Social History Narrative     None     Patient is a smoker.  Alcohol use as documented elsewhere.  Rare marijuana use none recent      Family History:   No family history on file.  Reviewed and not felt to be contributory. No history of seizures       Home Medications:     Prior to Admission Medications   Prescriptions Last Dose Informant Patient Reported? Taking?   albuterol (PROAIR HFA/PROVENTIL HFA/VENTOLIN HFA) 108 (90 Base) MCG/ACT inhaler  Self No Yes   Sig: Inhale 2 puffs into the lungs  every 4 hours as needed for shortness of breath / dyspnea or wheezing   fluticasone (FLOVENT HFA) 110 MCG/ACT inhaler  Self No Yes   Sig: Inhale 1 puff into the lungs 2 times daily   lisinopril-hydrochlorothiazide (PRINZIDE/ZESTORETIC) 20-25 MG tablet More than a month at Unknown time Self No No   Sig: Take 1 tablet by mouth daily   Patient not taking: Reported on 8/26/2019   naltrexone (DEPADE/REVIA) 50 MG tablet  Self No Yes   Sig: Take 1 tablet (50 mg) by mouth daily   ondansetron (ZOFRAN ODT) 4 MG ODT tab  Self No Yes   Sig: Take 1 tablet (4 mg) by mouth every 8 hours as needed for nausea   sertraline (ZOLOFT) 50 MG tablet More than a month at Unknown time Self No No   Sig: Take 1 tablet (50 mg) by mouth daily      Facility-Administered Medications: None          Allergy:   No Known Allergies       Inpatient Medications:   Scheduled Meds:    folic acid  1 mg Oral Daily     lisinopril  20 mg Oral Daily     multivitamin w/minerals  1 tablet Oral Daily     nicotine  1 patch Transdermal Daily     nicotine   Transdermal Q8H     sodium chloride (PF)  3 mL Intracatheter Q8H     vitamin B1  100 mg Oral Daily     PRN Meds: acetaminophen, acetaminophen, bisacodyl, labetalol, lidocaine 4%, lidocaine (buffered or not buffered), LORazepam **OR** LORazepam, LORazepam, magnesium sulfate, melatonin, naloxone, ondansetron **OR** ondansetron, oxyCODONE, polyethylene glycol, potassium chloride, potassium chloride with lidocaine, potassium chloride, potassium chloride, potassium chloride, potassium phosphate (KPHOS) in D5W IV, potassium phosphate (KPHOS) in D5W IV, potassium phosphate (KPHOS) in D5W IV, potassium phosphate (KPHOS) in D5W IV, prochlorperazine **OR** prochlorperazine **OR** prochlorperazine, senna-docusate **OR** senna-docusate, sodium chloride (PF)        Review of Systems    The Review of Systems is negative other than noted in the HPI  A comprehensive review of  10 systems was performed and found to be negative  except as described in this note  CONSTITUTIONAL: negative for fever, chills, change in weight  INTEGUMENTARY/SKIN: no rash or obvious new lesions  ENT/MOUTH: no sore throat, new sinus pain or nasal drainage, no neck mass noted  RESP: No pleuretic pain, No cough, no hemoptysis, No SOB   CV: negative for chest pain, palpitations or peripheral edema  GI: no nausea, vomiting, change in stoolsCrohn's is inactive  : no dysuria or hematuria  MUSCULOSKELETAL: no myalgias, arthralgias or join efffusion  ENDOCRINE: no history of polyuria, polydyspsia or symptoms of thyroid dysfunction  PSYCHIATRIC: no change in mood stable  LYMPHATIC: no new lymphadenopathy  HEME: no bleeding or easy bruisability  NEURO: see HPI       Physical Exam:   Physical Exam   Vitals:  Height:Data Unavailable  Weight:0 lbs 0 oz   Temp: 98.1  F (36.7  C) Temp src: Axillary BP: (!) 139/110 Pulse: 104 Heart Rate: 98 Resp: 16 SpO2: 98 % O2 Device: None (Room air)    General Appearance:  No acute distressTongue trauma noted  Neuro:       Mental Status Exam:    Alert and oriented.  Language and speech intact.       Cranial Nerves:   2 through 12 intact.  Fundus technically difficult.           Motor:   Tone bulk and strength intact x4           Reflexes:  Symmetrically intact.  Plantar signs normal.  No clonus       Sensory:  Vibration and cold sense intact x4                   Coordination:   Intact x4       Gait:  Intact   Neck: no nuchal rigidity, normal thyroid. No carotid bruits.    Cardiovascular: Regular rate and rhythm, no m/r/g  Extremities: No clubbing, no cyanosis, no edema       Data:   ROUTINE IP LABS   CBC RESULTS:     Recent Labs   Lab 04/30/20 2207 04/28/20  1607   WBC 4.0 5.6   RBC 4.45 4.58   HGB 16.3* 16.4*   HCT 45.9 46.0   PLT 75* 75*     Basic Metabolic Panel:   Recent Labs   Lab Test 04/30/20 2207 04/28/20  1607 02/27/19  1248    139 138   POTASSIUM 3.5 2.9* 3.8   CHLORIDE 98 103 104   CO2 31 26 26   BUN 11 8 12   CR 0.59  0.65 0.58   * 88 121*   SANTOS 9.7 8.0* 9.0     Liver panel:  Recent Labs   Lab Test 04/30/20  2207 04/28/20  1607 02/27/19  1248   PROTTOTAL 8.5 7.9 7.6   ALBUMIN 4.0 3.9 4.2   BILITOTAL 2.1* 1.0 0.3   ALKPHOS 176* 174* 55   * 395* 32   * 173* 30          IMAGING:   All imaging studies were reviewed personally  CT head:   EXAM: CT HEAD W/O CONTRAST  LOCATION: [facility]  DATE/TIME: 4/30/2020 11:21 PM     INDICATION: Seizure.  COMPARISON: None.  TECHNIQUE: Routine without IV contrast. Multiplanar reformats. Dose reduction techniques were used.     FINDINGS:  INTRACRANIAL CONTENTS: No intracranial hemorrhage, extraaxial collection, or mass effect.  No acute large territory infarction. Asymmetric basal ganglia mineralization is identified on the left. A small focus of hypodensity involving the right inferior   basal ganglia likely reflects a prominent perivascular space, however, a lacunar type infarction could have a similar appearance. Normal parenchymal attenuation. Normal ventricles and sulci.      VISUALIZED ORBITS/SINUSES/MASTOIDS: No intraorbital abnormality. No paranasal sinus mucosal disease. No middle ear or mastoid effusion.     BONES/SOFT TISSUES: No acute abnormality.                                                                      IMPRESSION:        No definite acute intracranial abnormality. If the patient is experiencing an acute, focal, or ongoing neurologic deficit, or if the patient's seizures persist, an MRI may be indicated.      A small focus of hypodensity involving the right inferior basal ganglia likely reflects a prominent perivascular space, however, an age-indeterminate lacunar type infarction could have a similar appearance

## 2020-05-02 VITALS
WEIGHT: 195.8 LBS | TEMPERATURE: 98 F | DIASTOLIC BLOOD PRESSURE: 119 MMHG | BODY MASS INDEX: 32.58 KG/M2 | SYSTOLIC BLOOD PRESSURE: 158 MMHG | OXYGEN SATURATION: 97 % | RESPIRATION RATE: 16 BRPM | HEART RATE: 104 BPM

## 2020-05-02 LAB
ALBUMIN SERPL-MCNC: 3.4 G/DL (ref 3.4–5)
ALP SERPL-CCNC: 129 U/L (ref 40–150)
ALT SERPL W P-5'-P-CCNC: 111 U/L (ref 0–50)
ANION GAP SERPL CALCULATED.3IONS-SCNC: 6 MMOL/L (ref 3–14)
AST SERPL W P-5'-P-CCNC: 114 U/L (ref 0–45)
BILIRUB SERPL-MCNC: 1.3 MG/DL (ref 0.2–1.3)
BUN SERPL-MCNC: 8 MG/DL (ref 7–30)
CALCIUM SERPL-MCNC: 9.3 MG/DL (ref 8.5–10.1)
CHLORIDE SERPL-SCNC: 106 MMOL/L (ref 94–109)
CO2 SERPL-SCNC: 29 MMOL/L (ref 20–32)
CREAT SERPL-MCNC: 0.6 MG/DL (ref 0.52–1.04)
ERYTHROCYTE [DISTWIDTH] IN BLOOD BY AUTOMATED COUNT: 12.6 % (ref 10–15)
GFR SERPL CREATININE-BSD FRML MDRD: >90 ML/MIN/{1.73_M2}
GLUCOSE SERPL-MCNC: 98 MG/DL (ref 70–99)
HCT VFR BLD AUTO: 43.9 % (ref 35–47)
HGB BLD-MCNC: 14.8 G/DL (ref 11.7–15.7)
MAGNESIUM SERPL-MCNC: 1.8 MG/DL (ref 1.6–2.3)
MCH RBC QN AUTO: 34.9 PG (ref 26.5–33)
MCHC RBC AUTO-ENTMCNC: 33.7 G/DL (ref 31.5–36.5)
MCV RBC AUTO: 104 FL (ref 78–100)
PHOSPHATE SERPL-MCNC: 4.5 MG/DL (ref 2.5–4.5)
PLATELET # BLD AUTO: 77 10E9/L (ref 150–450)
POTASSIUM SERPL-SCNC: 3 MMOL/L (ref 3.4–5.3)
PROT SERPL-MCNC: 7.1 G/DL (ref 6.8–8.8)
RBC # BLD AUTO: 4.24 10E12/L (ref 3.8–5.2)
SODIUM SERPL-SCNC: 141 MMOL/L (ref 133–144)
WBC # BLD AUTO: 4.5 10E9/L (ref 4–11)

## 2020-05-02 PROCEDURE — 85027 COMPLETE CBC AUTOMATED: CPT | Performed by: INTERNAL MEDICINE

## 2020-05-02 PROCEDURE — 25000132 ZZH RX MED GY IP 250 OP 250 PS 637: Performed by: INTERNAL MEDICINE

## 2020-05-02 PROCEDURE — 99238 HOSP IP/OBS DSCHRG MGMT 30/<: CPT | Performed by: INTERNAL MEDICINE

## 2020-05-02 PROCEDURE — 83735 ASSAY OF MAGNESIUM: CPT | Performed by: INTERNAL MEDICINE

## 2020-05-02 PROCEDURE — 36415 COLL VENOUS BLD VENIPUNCTURE: CPT | Performed by: INTERNAL MEDICINE

## 2020-05-02 PROCEDURE — 40000007 ZZH STATISTIC ADULT CD FACE TO FACE-NO CHRG

## 2020-05-02 PROCEDURE — 84100 ASSAY OF PHOSPHORUS: CPT | Performed by: INTERNAL MEDICINE

## 2020-05-02 PROCEDURE — 80053 COMPREHEN METABOLIC PANEL: CPT | Performed by: INTERNAL MEDICINE

## 2020-05-02 RX ORDER — DIPHENHYDRAMINE HYDROCHLORIDE AND LIDOCAINE HYDROCHLORIDE AND ALUMINUM HYDROXIDE AND MAGNESIUM HYDRO
10 KIT EVERY 6 HOURS PRN
Status: DISCONTINUED | OUTPATIENT
Start: 2020-05-02 | End: 2020-05-02 | Stop reason: HOSPADM

## 2020-05-02 RX ORDER — DIPHENHYDRAMINE HYDROCHLORIDE AND LIDOCAINE HYDROCHLORIDE AND ALUMINUM HYDROXIDE AND MAGNESIUM HYDRO
10 KIT EVERY 6 HOURS PRN
Qty: 237 ML | Refills: 0 | Status: SHIPPED | OUTPATIENT
Start: 2020-05-02

## 2020-05-02 RX ADMIN — LISINOPRIL 20 MG: 20 TABLET ORAL at 07:57

## 2020-05-02 RX ADMIN — POTASSIUM CHLORIDE 40 MEQ: 1500 TABLET, EXTENDED RELEASE ORAL at 07:56

## 2020-05-02 RX ADMIN — DIPHENHYDRAMINE HYDROCHLORIDE AND LIDOCAINE HYDROCHLORIDE AND ALUMINUM HYDROXIDE AND MAGNESIUM HYDRO 10 ML: KIT at 10:48

## 2020-05-02 RX ADMIN — NICOTINE 1 PATCH: 14 PATCH, EXTENDED RELEASE TRANSDERMAL at 07:55

## 2020-05-02 RX ADMIN — FOLIC ACID 1 MG: 1 TABLET ORAL at 07:57

## 2020-05-02 RX ADMIN — MULTIPLE VITAMINS W/ MINERALS TAB 1 TABLET: TAB at 07:57

## 2020-05-02 RX ADMIN — THIAMINE HCL TAB 100 MG 100 MG: 100 TAB at 07:57

## 2020-05-02 RX ADMIN — POTASSIUM CHLORIDE 20 MEQ: 1500 TABLET, EXTENDED RELEASE ORAL at 10:08

## 2020-05-02 ASSESSMENT — ACTIVITIES OF DAILY LIVING (ADL)
ADLS_ACUITY_SCORE: 8

## 2020-05-02 NOTE — CONSULTS
"5/2/2020    Writer reviewed chart and noted that pt declined CD consult as she has services in place.   Writer called pt to discuss her CD plan. Pt reported she has a CD counselor she has been working with for 15 years, her mom is supportive and helping her obtain tx, and they are in discussion with her previous counselor at [facility]. Pt reports if [facility] doesn't have a bed, her and support team are finding other options. Pt clearly can obtain CD services on her own. Pt reports her mom took her keys and her debit/credit cards so pt cannot buy alcohol \"even if I wanted to.\"  Pt reports she is motivated for sobriety, and has the means/support to obtain services outside of the hospital. Pt reports she understands the medical team concerns with her high VISHAL and seizure, reports she is taking this seriously but feels she isn't be heard by her provider.   Pt reported she didn't have a pen, but writer said my info would be in the chart and it can be provided to pt in her discharge summary. Pt thanked writer for calling and talking with her. Pt has private insurance, can seek services anywhere.     For outpatient appt if needed:  Mental Health and Addiction Services Line   [phone]    Edilma Jung [facility]  [phone]        "

## 2020-05-02 NOTE — DISCHARGE SUMMARY
[facility]  Discharge Summary        Page Moreira MRN# [id number]   YOB: 1985 Age: 34 year old     Date of Admission:  4/30/2020  Date of Discharge:  5/2/2020  Admitting Physician:  Mehul Solano MD  Discharge Physician: Mehul Solano MD  Discharging Service: Hospitalist     Primary Provider:  Gisselle Jeffery  Primary Care Physician Phone Number: [phone]         Discharge Diagnoses/Problem Oriented Hospital Course (Providers):    Page Moreira was admitted on 4/30/2020 by Mehul Solano MD and I would refer you to their history and physical.  The following problems were addressed during her hospitalization:    Page Moreira is a 34 year old female admitted on 4/30/2020. She presents to the emergency department approximately 72 hours after her last alcoholic drink with witnessed seizure-like activity and a small tongue hematoma concerning for first-time alcohol withdrawal seizure.     Alcohol dependence with acute alcohol withdrawal:  Patient with an aura followed by seizure-like activity witnessed by mother approximately 72 hours after last alcoholic drink.  Head CT without overt cause for seizure.  History of alcohol withdrawal in the past without seizure.  -Seizure precautions with IV Ativan, no evidence for recurrent seizure activity  -given IVF, thiamine, folate, multivitamin  -Patient counseled on no driving until seizure-free for 3 months, seizure episode reporting to [facility] score 0  -stable to go home  - she is planning on inpatient treatment after outpatient rule 25  - she is being discharge to her mother's home.    Alcohol withdrawal seizure with tongue laceration  -Most likely represents alcohol withdrawal seizure.  I have not ordered MRI in this setting, though can be pursued if recommended by neurology.   -Neurology consult given first time seizure event; appreciate recommendations no need for EEG or MRI   -Received phenobarbital in the  emergency department.  I have not continued antiepileptic therapies in the setting of alcohol withdrawal seizure, though will receive Ativan as needed for alcohol withdrawal  - magic mouthwash for comfort     Steatohepatitis: Elevated LFTs with right upper quadrant abdominal ultrasound 4/28 demonstrating fatty infiltration of the liver without focal hepatic mass.  -Weight loss as able  -Alcohol cessation  -Follow-up CMP as an outpatient with prolonged sobriety     Hypertension:  -Lisinopril 20 mg daily to continue  -resume hydrochlorothiazide       Depression:  - resume zoloft     Tobacco dependence:  -Smoking cessation recommended     Crohn's disease: Not currently on medications for Crohn's.  History of anal fistulas, history of end ilectomy.  Does not currently appear to be in a flare.   -Outpatient GI follow-up; discussed with patient.            Code Status:      Full Code         Important Results:      NAD  HEENT TONGUE TRAUMA  PULM CTA  CV RRR  GI SOFT +BS  MS NO EDEMA  NEURO NON FOCAL         Pending Results:        Unresulted Labs Ordered in the Past 30 Days of this Admission     No orders found from 3/31/2020 to 5/1/2020.               Discharge Instructions and Follow-Up:      Follow-up Appointments     Follow-up and recommended labs and tests       Follow up with primary care provider, Gisselle Jeffery, within 7 days for   hospital follow- up.  No follow up labs or test are needed.                  Discharge Disposition:      Discharged to home         Discharge Medications:        Current Discharge Medication List      START taking these medications    Details   magic mouthwash suspension, diphenhydrAMINE, lidocaine, aluminum-magnesium & simethicone, (FIRST-MOUTHWASH BLM) compounding kit Swish and swallow 10 mLs in mouth every 6 hours as needed for mouth sores  Qty: 237 mL, Refills: 0    Associated Diagnoses: Alcohol withdrawal seizure without complication (H)         CONTINUE these medications which  have NOT CHANGED    Details   albuterol (PROAIR HFA/PROVENTIL HFA/VENTOLIN HFA) 108 (90 Base) MCG/ACT inhaler Inhale 2 puffs into the lungs every 4 hours as needed for shortness of breath / dyspnea or wheezing  Qty: 18 g, Refills: 3    Associated Diagnoses: Wheezy bronchitis      fluticasone (FLOVENT HFA) 110 MCG/ACT inhaler Inhale 1 puff into the lungs 2 times daily  Qty: 1 Inhaler, Refills: 2    Associated Diagnoses: Wheezing; Cough      naltrexone (DEPADE/REVIA) 50 MG tablet Take 1 tablet (50 mg) by mouth daily  Qty: 90 tablet, Refills: 1    Associated Diagnoses: Alcohol dependence with unspecified alcohol-induced disorder (H)      ondansetron (ZOFRAN ODT) 4 MG ODT tab Take 1 tablet (4 mg) by mouth every 8 hours as needed for nausea  Qty: 10 tablet, Refills: 0      lisinopril-hydrochlorothiazide (PRINZIDE/ZESTORETIC) 20-25 MG tablet Take 1 tablet by mouth daily  Qty: 90 tablet, Refills: 1    Associated Diagnoses: Essential hypertension      sertraline (ZOLOFT) 50 MG tablet Take 1 tablet (50 mg) by mouth daily  Qty: 90 tablet, Refills: 1    Associated Diagnoses: Moderate episode of recurrent major depressive disorder (H); Generalized anxiety disorder                  Allergies:       No Known Allergies         Consultations This Hospital Stay:      Consultation during this admission received from neurology          Discharge Orders         After Care Instructions     Activity      Your activity upon discharge: no driving for 12 weeks         Diet      Follow this diet upon discharge: Orders Placed This Encounter      Combination Diet Regular Diet Adult         Discharge Instructions      Report to DMV about seizure episode    Follow up with outpatient CD counselor and treatment                      Discharge Time:      Less than 30 minutes.        Image Results From This Hospital Stay (For Non-EPIC Providers):        Results for orders placed or performed during the hospital encounter of 04/30/20   Head CT w/o  contrast    Narrative    EXAM: CT HEAD W/O CONTRAST  LOCATION: [facility]  DATE/TIME: 4/30/2020 11:21 PM    INDICATION: Seizure.  COMPARISON: None.  TECHNIQUE: Routine without IV contrast. Multiplanar reformats. Dose reduction techniques were used.    FINDINGS:  INTRACRANIAL CONTENTS: No intracranial hemorrhage, extraaxial collection, or mass effect.  No acute large territory infarction. Asymmetric basal ganglia mineralization is identified on the left. A small focus of hypodensity involving the right inferior   basal ganglia likely reflects a prominent perivascular space, however, a lacunar type infarction could have a similar appearance. Normal parenchymal attenuation. Normal ventricles and sulci.     VISUALIZED ORBITS/SINUSES/MASTOIDS: No intraorbital abnormality. No paranasal sinus mucosal disease. No middle ear or mastoid effusion.    BONES/SOFT TISSUES: No acute abnormality.      Impression    IMPRESSION:      No definite acute intracranial abnormality. If the patient is experiencing an acute, focal, or ongoing neurologic deficit, or if the patient's seizures persist, an MRI may be indicated.     A small focus of hypodensity involving the right inferior basal ganglia likely reflects a prominent perivascular space, however, an age-indeterminate lacunar type infarction could have a similar appearance.             Most Recent Lab Results In EPIC (For Non-EPIC Providers):    Most Recent 3 CBC's:  Recent Labs   Lab Test 05/02/20  0611 04/30/20 2207 04/28/20  1607   WBC 4.5 4.0 5.6   HGB 14.8 16.3* 16.4*   * 103* 100   PLT 77* 75* 75*      Most Recent 3 BMP's:  Recent Labs   Lab Test 05/02/20  0611 04/30/20 2207 04/28/20  1607    134 139   POTASSIUM 3.0* 3.5 2.9*   CHLORIDE 106 98 103   CO2 29 31 26   BUN 8 11 8   CR 0.60 0.59 0.65   ANIONGAP 6 5 10   SANTOS 9.3 9.7 8.0*   GLC 98 120* 88     Most Recent 3 Troponin's:No lab results found.    Invalid input(s): TROP, TROPONINIES  Most Recent 3  INR's:No lab results found.  Most Recent 2 LFT's:  Recent Labs   Lab Test 05/02/20  0611 04/30/20  2207   * 207*   * 142*   ALKPHOS 129 176*   BILITOTAL 1.3 2.1*     Most Recent Cholesterol Panel:No lab results found.  Most Recent 6 Bacteria Isolates From Any Culture (See EPIC Reports for Culture Details):No lab results found.  Most Recent TSH, T4 and HgbA1c:No lab results found.

## 2020-05-02 NOTE — PROGRESS NOTES
Reviewed discharge with pt. Verbalized understanding.   Pt aware of no driving for 12 weeks.  Pt to discharge with mother and follow up with CD counselor outpatient.  Pt agrees to plan.     5/3 - spoke with Dr. Solano regarding K level from yesterday.  Pt replaced with 60 MEq, pt is fine with that replacement - no follow up needed.

## 2020-05-02 NOTE — PLAN OF CARE
Pt A&Ox4. CMS intact. VSS ex HTN-MD aware. Up indep. Denies pain. CIWA score of 0 and 3. Voiding adequately. Seizure pads in place. Continue to monitor.

## 2020-05-04 ENCOUNTER — TELEPHONE (OUTPATIENT)
Dept: FAMILY MEDICINE | Facility: CLINIC | Age: 35
End: 2020-05-04

## 2020-05-04 ENCOUNTER — PATIENT OUTREACH (OUTPATIENT)
Dept: CARE COORDINATION | Facility: CLINIC | Age: 35
End: 2020-05-04

## 2020-05-04 ENCOUNTER — VIRTUAL VISIT (OUTPATIENT)
Dept: FAMILY MEDICINE | Facility: CLINIC | Age: 35
End: 2020-05-04
Payer: COMMERCIAL

## 2020-05-04 DIAGNOSIS — F33.1 MODERATE EPISODE OF RECURRENT MAJOR DEPRESSIVE DISORDER (H): ICD-10-CM

## 2020-05-04 DIAGNOSIS — F41.1 GENERALIZED ANXIETY DISORDER: ICD-10-CM

## 2020-05-04 DIAGNOSIS — Z30.42 DEPO-PROVERA CONTRACEPTIVE STATUS: ICD-10-CM

## 2020-05-04 DIAGNOSIS — R06.2 WHEEZING: ICD-10-CM

## 2020-05-04 DIAGNOSIS — R79.89 ELEVATED LFTS: ICD-10-CM

## 2020-05-04 DIAGNOSIS — F10.288 ALCOHOL DEPENDENCE WITH OTHER ALCOHOL-INDUCED DISORDER (H): Primary | ICD-10-CM

## 2020-05-04 DIAGNOSIS — R05.3 CHRONIC COUGH: ICD-10-CM

## 2020-05-04 PROCEDURE — 99214 OFFICE O/P EST MOD 30 MIN: CPT | Mod: TEL | Performed by: INTERNAL MEDICINE

## 2020-05-04 RX ORDER — ALBUTEROL SULFATE 90 UG/1
2 AEROSOL, METERED RESPIRATORY (INHALATION) EVERY 4 HOURS PRN
Qty: 18 G | Refills: 3 | Status: SHIPPED | OUTPATIENT
Start: 2020-05-04 | End: 2021-06-11

## 2020-05-04 RX ORDER — FLUTICASONE PROPIONATE 110 UG/1
1 AEROSOL, METERED RESPIRATORY (INHALATION) 2 TIMES DAILY
Qty: 1 INHALER | Refills: 2 | Status: SHIPPED | OUTPATIENT
Start: 2020-05-04

## 2020-05-04 RX ORDER — MEDROXYPROGESTERONE ACETATE 150 MG/ML
150 INJECTION, SUSPENSION INTRAMUSCULAR
Qty: 1 ML | Refills: 3 | OUTPATIENT
Start: 2020-05-04

## 2020-05-04 ASSESSMENT — ANXIETY QUESTIONNAIRES
1. FEELING NERVOUS, ANXIOUS, OR ON EDGE: NEARLY EVERY DAY
2. NOT BEING ABLE TO STOP OR CONTROL WORRYING: MORE THAN HALF THE DAYS
3. WORRYING TOO MUCH ABOUT DIFFERENT THINGS: NEARLY EVERY DAY
7. FEELING AFRAID AS IF SOMETHING AWFUL MIGHT HAPPEN: SEVERAL DAYS
5. BEING SO RESTLESS THAT IT IS HARD TO SIT STILL: MORE THAN HALF THE DAYS
6. BECOMING EASILY ANNOYED OR IRRITABLE: MORE THAN HALF THE DAYS
GAD7 TOTAL SCORE: 16

## 2020-05-04 ASSESSMENT — PATIENT HEALTH QUESTIONNAIRE - PHQ9
5. POOR APPETITE OR OVEREATING: NEARLY EVERY DAY
SUM OF ALL RESPONSES TO PHQ QUESTIONS 1-9: 17

## 2020-05-04 NOTE — PROGRESS NOTES
"Page Moreira is a 34 year old female who is being evaluated via a billable telephone visit.      The patient has been notified of following:     \"This telephone visit will be conducted via a call between you and your physician/provider. We have found that certain health care needs can be provided without the need for a physical exam.  This service lets us provide the care you need with a short phone conversation.  If a prescription is necessary we can send it directly to your pharmacy.  If lab work is needed we can place an order for that and you can then stop by our lab to have the test done at a later time.    Telephone visits are billed at different rates depending on your insurance coverage. During this emergency period, for some insurers they may be billed the same as an in-person visit.  Please reach out to your insurance provider with any questions.    If during the course of the call the physician/provider feels a telephone visit is not appropriate, you will not be charged for this service.\"    Patient has given verbal consent for Telephone visit?  Yes    What phone number would you like to be contacted at? [phone]    How would you like to obtain your AVS? Jose Jc     Page Moreira is a 34 year old female who presents to clinic today for the following health issues:    [facility]    Hospital Follow-up Visit:    Hospital/Nursing Home/IP Rehab Facility: [facility]  Date of Admission: 4/30/20  Date of Discharge: 05/02/20  Reason(s) for Admission: Seizure follow up      Was your hospitalization related to COVID-19? No   Problems taking medications regularly:  None  Medication changes since discharge: None  Problems adhering to non-medication therapy:  None    Summary of hospitalization:  [facility] discharge summary reviewed  Diagnostic Tests/Treatments reviewed.  Follow up needed: repeat LFTs  Other Healthcare Providers Involved in Patient s Care:         None  Update since " discharge: improved.   Post Discharge Medication Reconciliation: discharge medications reconciled, continue medications without change.  Plan of care communicated with patient              I spoke with Page today to follow up her hospitalization via phone visit due to COVID-19 restrictions.     She went to the ED with alcohol intoxication, was discharged. Went back two days later after having an alcohol withdrawal seizure and was admitted for two days.  She was given phenobarbital initially, evaluated by neuro.  No further seizures.  Also noted to have elevated LFTs (initial , , lipase 818, alk phos 174) - ultrasound showed hepatic steatosis.     She has been struggling with increased alcohol use on her furlough.  She has also been out of sertraline and naltrexone for awhile due to insurance lapse.  She is home living with her mother, who is a great support for her.  Also has a CD counselor well established.  She has been struggling with mood and guilt over her drinking.  Mom has her keys and credit cards, so she cannot drive or get any alcohol.  She would like to go back to residential treatment, wondering what the options are through [place].     Still having cough and wheezing (she has been dealing with this since last summer). We did a trial of flovent last August - this helped transiently when she took it.  Has been out for months.     Would like to restart depo.            Reviewed and updated as needed this visit by Provider         Review of Systems   Const, msk, GI, psych reviewed,  otherwise negative unless noted above.         Objective   Reported vitals:  There were no vitals taken for this visit.   healthy, alert and no distress  PSYCH: Alert and oriented times 3; coherent speech, normal   rate and volume, able to articulate logical thoughts, able   to abstract reason, no tangential thoughts, no hallucinations   or delusions  Her affect is normal  RESP: No cough, no audible wheezing,  able to talk in full sentences  Remainder of exam unable to be completed due to telephone visits    Diagnostic Test Results:  Labs reviewed in Epic        Assessment/Plan:  1. Alcohol dependence with other alcohol-induced disorder (H)  Referral placed to mental health to do assessment for residential treatment. She would like to restart naltrexone, recommended rechecking LFTs to make sure they are downtrending before restarting.   - MENTAL HEALTH REFERRAL  - Adult; Assessments and Testing; Chemical Health Assessment; [facility]:  Services [phone]; Patient call to schedule    2. Elevated LFTs  - Hepatic panel (Albumin, ALT, AST, Bili, Alk Phos, TP); Future    3. Moderate episode of recurrent major depressive disorder (H)  Refill ordered. May need to increase dose, but will start with the 50mg she was on previously, re-evaluate in 4 weeks.  Has passive SI. Talks to her CD counselor about this.  Mom is a great support.   - sertraline (ZOLOFT) 50 MG tablet; Take 1 tablet (50 mg) by mouth daily  Dispense: 90 tablet; Refill: 1    4. Generalized anxiety disorder  As above   - sertraline (ZOLOFT) 50 MG tablet; Take 1 tablet (50 mg) by mouth daily  Dispense: 90 tablet; Refill: 1    5. Wheezing  Will restart flovent and albuterol. When possible after COVID19, will get PFTs.   - fluticasone (FLOVENT HFA) 110 MCG/ACT inhaler; Inhale 1 puff into the lungs 2 times daily  Dispense: 1 Inhaler; Refill: 2    6. Chronic cough  - fluticasone (FLOVENT HFA) 110 MCG/ACT inhaler; Inhale 1 puff into the lungs 2 times daily  Dispense: 1 Inhaler; Refill: 2    7. Depo-Provera contraceptive status  Can come in for nurse visit to get depo.   - medroxyPROGESTERone (DEPO-PROVERA) 150 MG/ML IM injection; Inject 1 mL (150 mg) into the muscle every 3 months  Dispense: 1 mL; Refill: 3    Return in about 1 month (around 6/4/2020) for Mental health.      Phone call duration:  13 minutes    Gisselle Jeffery MD

## 2020-05-04 NOTE — PROGRESS NOTES
Clinic Care Coordination Contact  [facility]/Voicemail       Clinical Data: Care Coordinator Outreach  Outreach attempted x 1.  Left message on patient's voicemail with call back information and requested return call.  Plan:  Care Coordinator will try to reach patient again in 1-2 business days.

## 2020-05-04 NOTE — TELEPHONE ENCOUNTER
Please see today's encounter. Patient is being followed by Clinic Care Coordination.     Post Discharge Medication Reconciliation Status: unable to reconcile discharge medications due to See Care Coordination Note. Unable to do med rec..      Hillary Travis RN, BSN  [facility]

## 2020-05-04 NOTE — LETTER
[facility]  [address] [address]   [phone]  May 5, 2020    Page Moreira  [address]  [place] [address]      Dear Page,    I am a clinic community health worker who works with Gisselle Jeffery MD at [facility]. I have been trying to reach you recently to introduce Clinic Care Coordination and to see if there was anything I could assist you with.  Below is a description of clinic care coordination and how I can further assist you.      The clinic care coordination team is made up of a registered nurse,  and community health worker who understand the health care system. The goal of clinic care coordination is to help you manage your health and improve access to the health care system in the most efficient manner. The team can assist you in meeting your health care goals by providing education, coordinating services, strengthening the communication among your providers and supporting you with any resource needs.    Please feel free to contact the Community Health Worker at [phone] with any questions or concerns. We are focused on providing you with the highest-quality healthcare experience possible and that all starts with you.     Sincerely,     YANIRA Guthrie

## 2020-05-04 NOTE — TELEPHONE ENCOUNTER
Patient was discharged from [facility] on 05/02/20 after being treated for Alcohol Withdrawal Seizure Without Complication (H). Triage please follow up with patient.      .Sheila FLORES    [facility] Debra [facility]

## 2020-05-05 ASSESSMENT — ANXIETY QUESTIONNAIRES: GAD7 TOTAL SCORE: 16

## 2020-05-05 NOTE — PROGRESS NOTES
The clinic Community Health Worker spoke with the patient today due to ED/Hospital Discharge to discuss possible Clinic Care Coordination enrollment.  The service was described to the patient and immediate needs were discussed.  The patient declined enrollment at this time.  The PCP is encouraged to refer  in the future if the patient's needs change.

## 2020-05-05 NOTE — PROGRESS NOTES
Clinic Care Coordination Contact  [facility]/Voicemail       Clinical Data: Care Coordinator Outreach  Outreach attempted x 2.  Left message on patient's voicemail with call back information and requested return call.  Plan: Care Coordinator will send unable to contact letter with care coordinator contact information via [employer]. Care Coordinator will do no further outreaches at this time.

## 2020-05-06 ENCOUNTER — TELEPHONE (OUTPATIENT)
Dept: FAMILY MEDICINE | Facility: CLINIC | Age: 35
End: 2020-05-06

## 2020-05-06 NOTE — TELEPHONE ENCOUNTER
I spoke with her about this. It is fine to give depo without repeating the pregnancy test.     Gisselle Jeffery MD

## 2020-05-06 NOTE — TELEPHONE ENCOUNTER
Patient is scheduled for Depo injection tomorrow 5/7/2020 at 2:30pm. Patient has new orders for Depo injection from 5/4/2020.     Regarding Urine Pregnancy test Dr. Jeffery, it looks like patient had a pregnancy test (HCG Qualitative) on 4/28/2020 and it was negative. It looks like this was done when patient was in the ED.     Does patient need another pregnancy test prior to getting Depo Provera injection tomorrow?     Hillary Travis RN, BSN  [facility]

## 2020-05-07 ENCOUNTER — ALLIED HEALTH/NURSE VISIT (OUTPATIENT)
Dept: NURSING | Facility: CLINIC | Age: 35
End: 2020-05-07
Payer: COMMERCIAL

## 2020-05-07 DIAGNOSIS — R79.89 ELEVATED LFTS: ICD-10-CM

## 2020-05-07 DIAGNOSIS — F10.29 ALCOHOL DEPENDENCE WITH UNSPECIFIED ALCOHOL-INDUCED DISORDER (H): ICD-10-CM

## 2020-05-07 DIAGNOSIS — Z30.42 DEPO-PROVERA CONTRACEPTIVE STATUS: Primary | ICD-10-CM

## 2020-05-07 PROCEDURE — 36415 COLL VENOUS BLD VENIPUNCTURE: CPT | Performed by: INTERNAL MEDICINE

## 2020-05-07 PROCEDURE — 99207 ZZC NO CHARGE NURSE ONLY: CPT

## 2020-05-07 PROCEDURE — 80076 HEPATIC FUNCTION PANEL: CPT | Performed by: INTERNAL MEDICINE

## 2020-05-07 PROCEDURE — 96372 THER/PROPH/DIAG INJ SC/IM: CPT

## 2020-05-07 RX ORDER — MEDROXYPROGESTERONE ACETATE 150 MG/ML
150 INJECTION, SUSPENSION INTRAMUSCULAR
Status: DISCONTINUED | OUTPATIENT
Start: 2020-05-07 | End: 2020-10-28

## 2020-05-07 RX ADMIN — MEDROXYPROGESTERONE ACETATE 150 MG: 150 INJECTION, SUSPENSION INTRAMUSCULAR at 14:27

## 2020-05-07 NOTE — PROGRESS NOTES
Clinic Administered Medication Documentation      Depo Provera Documentation    URINE HCG: negative    Depo-Provera Standing Order inclusion/exclusion criteria reviewed.   Patient meets: inclusion criteria     BP: Data Unavailable  LAST PAP/EXAM: No results found for: PAP    Prior to injection, verified patient identity using patient's name and date of birth. Medication was administered. Please see MAR and medication order for additional information.     Was entire vial of medication used? Yes  Vial/Syringe: Single dose vial  Expiration Date:  NOV 2020    Patient instructed to remain in clinic for 15 minutes.  NEXT INJECTION DUE: 7/23/20 - 8/6/20  Denise Seals RN

## 2020-05-08 ENCOUNTER — PATIENT OUTREACH (OUTPATIENT)
Dept: CARE COORDINATION | Facility: CLINIC | Age: 35
End: 2020-05-08

## 2020-05-08 LAB
ALBUMIN SERPL-MCNC: 3.8 G/DL (ref 3.4–5)
ALP SERPL-CCNC: 93 U/L (ref 40–150)
ALT SERPL W P-5'-P-CCNC: 116 U/L (ref 0–50)
AST SERPL W P-5'-P-CCNC: 75 U/L (ref 0–45)
BILIRUB DIRECT SERPL-MCNC: 0.2 MG/DL (ref 0–0.2)
BILIRUB SERPL-MCNC: 0.5 MG/DL (ref 0.2–1.3)
PROT SERPL-MCNC: 8 G/DL (ref 6.8–8.8)

## 2020-05-08 RX ORDER — NALTREXONE HYDROCHLORIDE 50 MG/1
50 TABLET, FILM COATED ORAL DAILY
Qty: 90 TABLET | Refills: 1 | Status: SHIPPED | OUTPATIENT
Start: 2020-05-08

## 2020-05-08 NOTE — PROGRESS NOTES
Clinical Product Navigator  reviewed chart; patient on payer product coverage.  Review results: Patient was outreached by CHW; declined enrollment at this time. As per chart review patient is working with PCP to manage medications and alcoholism.     Candelaria Marques Clinical Product Navigator

## 2020-05-12 ENCOUNTER — HOSPITAL ENCOUNTER (OUTPATIENT)
Dept: BEHAVIORAL HEALTH | Facility: CLINIC | Age: 35
Discharge: HOME | End: 2020-05-12
Attending: FAMILY MEDICINE | Admitting: FAMILY MEDICINE
Payer: COMMERCIAL

## 2020-05-12 VITALS — BODY MASS INDEX: 31.65 KG/M2 | HEIGHT: 65 IN | WEIGHT: 190 LBS

## 2020-05-12 PROCEDURE — H0001 ALCOHOL AND/OR DRUG ASSESS: HCPCS | Mod: HF,95

## 2020-05-12 ASSESSMENT — ANXIETY QUESTIONNAIRES
3. WORRYING TOO MUCH ABOUT DIFFERENT THINGS: MORE THAN HALF THE DAYS
1. FEELING NERVOUS, ANXIOUS, OR ON EDGE: MORE THAN HALF THE DAYS
IF YOU CHECKED OFF ANY PROBLEMS ON THIS QUESTIONNAIRE, HOW DIFFICULT HAVE THESE PROBLEMS MADE IT FOR YOU TO DO YOUR WORK, TAKE CARE OF THINGS AT HOME, OR GET ALONG WITH OTHER PEOPLE: SOMEWHAT DIFFICULT
5. BEING SO RESTLESS THAT IT IS HARD TO SIT STILL: MORE THAN HALF THE DAYS
2. NOT BEING ABLE TO STOP OR CONTROL WORRYING: SEVERAL DAYS
6. BECOMING EASILY ANNOYED OR IRRITABLE: MORE THAN HALF THE DAYS
GAD7 TOTAL SCORE: 11
4. TROUBLE RELAXING: SEVERAL DAYS
7. FEELING AFRAID AS IF SOMETHING AWFUL MIGHT HAPPEN: SEVERAL DAYS

## 2020-05-12 ASSESSMENT — PAIN SCALES - GENERAL: PAINLEVEL: NO PAIN (0)

## 2020-05-12 ASSESSMENT — MIFFLIN-ST. JEOR: SCORE: 1572.71

## 2020-05-12 ASSESSMENT — PATIENT HEALTH QUESTIONNAIRE - PHQ9: SUM OF ALL RESPONSES TO PHQ QUESTIONS 1-9: 14

## 2020-05-12 NOTE — PROGRESS NOTES
"Start time: 12:23 pm  End time: 2:07 pm     'We have found that certain health care needs can be provided without the need for a face to face visit.  This service lets us provide the care you need with a phone conversation.       I will have full access to your [facility] medical record during this entire phone call.   I will be taking notes for your medical record.      Since this is like an office visit, we will bill your insurance company for this service.       There are potential benefits and risks of telephone visits (e.g. limits to patient confidentiality) that differ from in-person visits.?  Confidentiality still applies for telephone services, and nobody will record the visit.  It is important to be in a quiet, private space that is free of distractions (including cell phone or other devices) during the visit.??      If during the course of the call I believe a telephone visit is not appropriate, you will not be charged for this service\"     Consent has been obtained for this service by care team member: Yes, by support staff and also verbally by this counselor.  Also, the pt gave consent for this counselor to talk with him via e-mail and phone and his emergency contact.  Rule 25 Assessment  Background Information   1. Date of Assessment Request  2. Date of Assessment  5/12/2020 3. Date Service Authorized     4.   VASQUEZ Lopez   5.  Phone Number   [phone] 6. Referent  Self 7. Assessment Site  [facility]     8. Client Name   Page Moreira 9. Date of Birth  1985 Age  34 year old 10. Gender  female  11. PMI/ Insurance No.  [id number]   12. Client's Primary Language:  English 13. Do you require special accommodations, such as an  or assistance with written material? No   14. Current Address: [address] [place] [address]   15. Client Phone Numbers: [phone] (home)      16. Tell me what has happened to bring you here " "today.   \"I have struggled with drinking for many years since 19, and several treatments. Recently, I have been in on furlough, my ETOH was so high, my mother ended up calling the ambualnce and later on I had a seizures. Obvioulsy, my furlough and not being on meddications have had an effect on me. I know going back to work would help but I also need some sober help and coping skills in my life.\"    17. Have you had other rule 25 assessments?      2 years ago, went to detox, and was in treatment for 30 days in June and July 2018.    DIMENSION I - Acute Intoxication /Withdrawal Potential   1. Chemical use most recent 12 months outside a facility and other significant use history (client self-report)              X = Primary Drug Used   Age of First Use Most Recent Pattern of Use and Duration   Need enough information to show pattern (both frequency and amounts) and to show tolerance for each chemical that has a diagnosis   Date of last use and time, if needed   Withdrawal Potential? Requiring special care Method of use  (oral, smoked, snort, IV, etc)      Alcohol     14 Mid-20s drinking about a liter of vodka daily began to be multiple times a week. Drinking daily my late 20s and early 30s I started drinking at work. I brought it with me to drink in order to avoid withdrawal 4/28/20 at 12 pm no oral      Marijuana/  Hashish   [age] smoked it at parties or when my anixety got bad.  Smoked it a couple being home or do a couple hits, once a month  5/11/20 at 9 pm no smoked      Cocaine/Crack     No use          Meth/  Amphetamines   No use          Heroin     No use          Other Opiates/  Synthetics   No use          Inhalants     No use          Benzodiazepines     No use          Hallucinogens     No use          Barbiturates/  Sedatives/  Hypnotics No use          Over-the-Counter Drugs   No use          Other     No use          Nicotine     14   Smoked at least 10 cigarettes daily  5/12/20 no smoked     2. Do " you use greater amounts of alcohol/other drugs to feel intoxicated or achieve the desired effect?  Yes.  Or use the same amount and get less of an effect?  Yes.  Example: The patient reported having increased use and tolerance issues with alcohol.    3A. Have you ever been to detox?     Yes yes    3B. When was the first time?     2018     3C. How many times since then?     The patient denied ever having a detoxification admission.    3D. Date of most recent detox:      2018     4.  Withdrawal symptoms: Have you had any of the following withdrawal symptoms?  Past 12 months Recent (past 30 days)   Sweating (Rapid Pulse)  Shaky / Jittery / Tremors  Unable to Sleep  Fatigue / Extremely Tired  High Blood Pressure  Diminished Appetite  Unable to Eat  Anxiety / Worried Sweating (Rapid Pulse)  Shaky / Jittery / Tremors  Unable to Sleep  Fatigue / Extremely Tired  Irritability  High Blood Pressure  Seizures  Hallucinations  Unable to Eat  Anxiety / Worried     's Visual Observations and Symptoms: No visible withdrawal symptoms at this time    Based on the above information, is withdrawal likely to require attention as part of treatment participation?  No    Dimension I Ratings   Acute intoxication/Withdrawal potential - The placing authority must use the criteria in Dimension I to determine a client s acute intoxication and withdrawal potential.    RISK DESCRIPTIONS - Severity ratin Client displays full functioning with good ability to tolerate and cope with withdrawal discomfort. No signs or symptoms of intoxication or withdrawal or resolving signs or symptoms.    REASONS SEVERITY WAS ASSIGNED (What about the amount of the person s use and date of most recent use and history of withdrawal problems suggests the potential of withdrawal symptoms requiring professional assistance? )     Patient reports alcohol as her drug of choice, admits to have had a seizure recently due alcohol withdrawal. Pt is  currently being prescribed alcohol cravings meds by her doctor,  no withdrawal symptoms or intoxication displayed during the assessment. He date of last use was on 4/48/20.         DIMENSION II - Biomedical Complications and Conditions   1a. Do you have any current health/medical conditions?(Include any infectious diseases, allergies, or chronic or acute pain, history of chronic conditions)       Yes.   Illnesses/Medical Conditions you are receiving care for: Hypertension and is being medicated    1b. On a scale of mild, moderate to severe please specify the severity of the patient's diabetes and/or neuropathy.    The patient denied having a history of being diagnosed with diabetes or neuropathy.    2. Do you have a health care provider? When was your most recent appointment? What concerns were identified?     [facility] Dr Almodovar, phone encounter last week, got back on my meds and blood test to check my liver function    3. If indicated by answers to items 1 or 2: How do you deal with these concerns? Is that working for you? If you are not receiving care for this problem, why not?      The patient reported taking prescription medications as prescribed for the above medical issues.    4A. List current medication(s) including over-the-counter or herbal supplements--including pain management:     Current Outpatient Medications   Medication     albuterol (PROAIR HFA/PROVENTIL HFA/VENTOLIN HFA) 108 (90 Base) MCG/ACT inhaler     fluticasone (FLOVENT HFA) 110 MCG/ACT inhaler     lisinopril-hydrochlorothiazide (PRINZIDE/ZESTORETIC) 20-25 MG tablet     medroxyPROGESTERone (DEPO-PROVERA) 150 MG/ML IM injection     naltrexone (DEPADE/REVIA) 50 MG tablet     ondansetron (ZOFRAN ODT) 4 MG ODT tab     sertraline (ZOLOFT) 50 MG tablet     magic mouthwash suspension, diphenhydrAMINE, lidocaine, aluminum-magnesium & simethicone, (FIRST-MOUTHWASH BLM) compounding kit     Current  Facility-Administered Medications   Medication     medroxyPROGESTERone (DEPO-PROVERA) injection 150 mg       4B. Do you follow current medical recommendations/take medications as prescribed?     Yes    4C. When did you last take your medication?     Today    4D. Do you need a referral to have a follow up with a primary care physician?    No.    5. Has a health care provider/healer ever recommended that you reduce or quit alcohol/drug use?     Yes, she asked me not drinking and prescribed Rx for alcohol cravings.    6. Are you pregnant?     No    7. Have you had any injuries, assaults/violence towards you, accidents, health related issues, overdose(s) or hospitalizations related to your use of alcohol or other drugs:     Yes, explain: A seizure two weeks ago.    8. Do you have any specific physical needs/accommodations? No    Dimension II Ratings   Biomedical Conditions and Complications - The placing authority must use the criteria in Dimension II to determine a client s biomedical conditions and complications.   RISK DESCRIPTIONS - Severity ratin Client displays full functioning with good ability to cope with physical discomfort.    REASONS SEVERITY WAS ASSIGNED (What physical/medical problems does this person have that would inhibit his or her ability to participate in treatment? What issues does he or she have that require assistance to address?)    Patient reports high blood pressure and seizure and is under doctor care.         DIMENSION III - Emotional, Behavioral, Cognitive Conditions and Complications   1. (Optional) Tell me what it was like growing up in your family. (substance use, mental health, discipline, abuse, support)      I had a rwally good childhood, my dad was not around, very loving, supportive, but I was always a rebel kid and now it's biting me in my buttock.    2. When was the last time that you had significant problems...  A. with feeling very trapped, lonely, sad, blue, depressed or  "hopeless  about the future? Past Month    B. with sleep trouble, such as bad dreams, sleeping restlessly, or falling  asleep during the day? Past Month    C. with feeling very anxious, nervous, tense, scared, panicked, or like  something bad was going to happen? Past Month    D. with becoming very distressed and upset when something reminded  you of the past? Past Month     E. with thinking about ending your life or committing suicide? Past Month      3. When was the last time that you did the following things two or more times?  A. Lied or conned to get things you wanted or to avoid having to do  something? Past Month     B. Had a hard time paying attention at school, work, or home? Past Month  month  C. Had a hard time listening to instructions at school, work, or home? Never    D. Were a bully or threatened other people? Never    E. Started physical fights with other people? Never    Note: These questions are from the Global Appraisal of Individual Needs--Short Screener. Any item marked  past month  or  2 to 12 months ago  will be scored with a severity rating of at least 2.     For each item that has occurred in the past month or past year ask follow up questions to determine how often the person has felt this way or has the behavior occurred? How recently? How has it affected their daily living? And, whether they were using or in withdrawal at the time?    \"Attributed her monthly symptoms to drinking, currently been on meds for two weeks and feeling much better.\"    4A. If the person has answered item 2E with  in the past year  or  the past month , ask about frequency and history of suicide in the family or someone close and whether they were under the influence.     The patient denied any family member or someone close to the patient had ever completed suicide.    Any history of suicide in your family? Or someone close to you?     The patient denied any family member or someone close to the patient had ever " completed suicide.    4B. If the person answered item 2E  in the past month  ask about  intent, plan, means and access and any other follow-up information  to determine imminent risk. Document any actions taken to intervene  on any identified imminent risk.      The patient denied having any suicide ideation within the past month.    5A. Have you ever been diagnosed with a mental health problem?     Yes, explain: Depression and anxiety    5B. Are you receiving care for any mental health issues? If yes, what is the focus of that care or treatment?  Are you satisfied with the service? Most recent appointment?  How has it been helpful?     The patient reported seeing a psychotherapist named Harish Escudero besides her doctor, but denied receiving any current treatment for mental health issues.    6. Have you been prescribed medications for emotional/psychological problems?     The patient reported a history of being prescribed psychotropic medications and being non-compliant, and is taking psychotropic medications as prescribed by physician.    7. Does your MH provider know about your use?     No    8A. Have you ever been verbally, emotionally, physically or sexually abused?      No     Follow up questions to learn current risk, continuing emotional impact.      The patient denied having any history of being verbally, emotionally, physically or sexually abused.    8B. Have you received counseling for abuse?      No    9. Have you ever experienced or been part of a group that experienced community violence, historical trauma, rape or assault?     No    10A. [place]:    No    11. Do you have problems with any of the following things in your daily life?    No      Note: If the person has any of the above problems, follow up with items 12, 13, and 14. If none of the issues in item 11 are a problem for the person, skip to item 15.    The patient denied having any history of having problems with headaches, dizziness, problem  solving, concentration, performing job/school work, remembering, in relationships with others, reading, writing, calculation, fights, being fired or arrests in her daily life.    12. Have you been diagnosed with traumatic brain injury or Alzheimer s?  No    13. If the answer to #12 is no, ask the following questions:    Have you ever hit your head or been hit on the head? No    Were you ever seen in the Emergency Room, hospital or by a doctor because of an injury to your head? No    Have you had any significant illness that affected your brain (brain tumor, meningitis, West Nile Virus, stroke or seizure, heart attack, near drowning or near suffocation)? No    14. If the answer to #12 is yes, ask if any of the problems identified in #11 occurred since the head injury or loss of oxygen. No    15A. Highest grade of school completed:     Associate degree/vocational certificate: cosmotology, phlebotomist     15B. Do you have a learning disability? No    15C. Did you ever have tutoring in Math or English? No    15D. Have you ever been diagnosed with Fetal Alcohol Effects or Fetal Alcohol Syndrome? No    16. If yes to item 15 B, C, or D: How has this affected your use or been affected by your use?     No    Dimension III Ratings   Emotional/Behavioral/Cognitive - The placing authority must use the criteria in Dimension III to determine a client s emotional, behavioral, and cognitive conditions and complications.   RISK DESCRIPTIONS - Severity ratin Client has difficulty with impulse control and lacks coping skills. Client has thoughts of suicide or harm to others without means; however, the thoughts may interfere with participation in some treatment activities. Client has difficulty functioning in significant life areas. Client has moderate symptoms of emotional, behavioral, or cognitive problems. Client is able to participate in most treatment activities.    REASONS SEVERITY WAS ASSIGNED - What current issues might  "with thinking, feelings or behavior pose barriers to participation in a treatment program? What coping skills or other assets does the person have to offset those issues? Are these problems that can be initially accommodated by a treatment provider? If not, what specialized skills or attributes must a provider have?    Patient reports diagnosis of anxiety and depression and taking psychotropic medications as prescribed. Pt reports severe difficulty with impulse control and use alcohol to cope with mental health issues and appears able to function cognitively. She reports no history of abuse nor desire to harm self or others. The patient's PHQ-9 score was 14 out of 27, indicating moderate depression.The patient's KIERRA-7 score was 11 out of 21, indicating moderate anxiety.       DIMENSION IV - Readiness for Change   1. You ve told me what brought you here today. (first section) What do you think the problem really is?     \" Anxiety led to drinking which caused me to depressed.\"    2. Tell me how things are going. Ask enough questions to determine whether the person has use related problems or assets that can be built upon in the following areas: Family/friends/relationships; Legal; Financial; Emotional; Educational; Recreational/ leisure; Vocational/employment; Living arrangements (DSM)       \"I distanced my friends when drinking, plans to meet up a friends who is an RN, good healthy friends, my family is supportive, lack of structure usually gets to me\"    3. What activities have you engaged in when using alcohol/other drugs that could be hazardous to you or others (i.e. driving a car/motorcycle/boat, operating machinery, unsafe sex, sharing needles for drugs or tattoos, etc     The patient reported having a history of driving while under the influence of alcohol or drugs.    4. How much time do you spend getting, using or getting over using alcohol or drugs? (DSM)     \"All day\"    5. Reasons for drinking/drug use " "(Use the space below to record answers. It may not be necessary to ask each item.)  Like the feeling Yes   Trying to forget problems No   To cope with stress Yes   To relieve physical pain No   To cope with anxiety Yes   To cope with depression Yes   To relax or unwind Yes   Makes it easier to talk with people No   Partner encourages use No   Most friends drink or use No   To cope with family problems No   Afraid of withdrawal symptoms/to feel better Yes   Other (specify)  No     A. What concerns other people about your alcohol or drug use/Has anyone told you that you use too much? What did they say? (DSM)     \"Shantel in my family has had concerns regarding my drinking and had requested that I get help because I am very open to them about my problem.\"    B. What did you think about that/ do you think you have a problem with alcohol or drug use?     \"Yes, I think I abused as a band aid, and to try to fix problems.\"    6. What changes are you willing to make? What substance are you willing to stop using? How are you going to do that? Have you tried that before? What interfered with your success with that goal?      \" Yeah, I alwauys been willing to do that but often think I don't believe I can. After my seizure I felt the need to be more serious about my sorviety because it was really scary for me\"    7. What would be helpful to you in making this change?     \" Inpatient or outpateint treatment, on line AA, and continues to take my medications\"    Dimension IV Ratings   Readiness for Change - The placing authority must use the criteria in Dimension IV to determine a client s readiness for change.   RISK DESCRIPTIONS - Severity ratin Client is motivated with active reinforcement, to explore treatment and strategies for change, but ambivalent about illness or need for change.    REASONS SEVERITY WAS ASSIGNED - (What information did the person provide that supports your assessment of his or her readiness to change? " "How aware is the person of problems caused by continued use? How willing is she or he to make changes? What does the person feel would be helpful? What has the person been able to do without help?)      Patient admits her drinking as problematic to self and bervalies motivation to stay sober.  Pt appears to be in the contemplation stages of change, demonstrates minimal awareness of her addiction and needs education surrounding the disease.        DIMENSION V - Relapse, Continued Use, and Continued Problem Potential   1A. In what ways have you tried to control, cut-down or quit your use? If you have had periods of sobriety, how did you accomplish that? What was helpful? What happened to prevent you from continuing your sobriety? (DSM)     I tried antabuse, neltraxone, I have given my mom the key to my truck and my cards so that I don't have the mean to buy booze, I would say the longest period of sobriety is 3 months, stressful situations and not reaching out to others.\"    1B. What were the circumstances of your most recent relapse with mood altering chemicals?    \"Anxiety, stressful situations and boredom\"    2. Have you experienced cravings? If yes, ask follow up questions to determine if the person recognizes triggers and if the person has had any success in dealing with them.     The patient denied having any current cravings to use mood altering chemicals. daily    3. Have you been treated for alcohol/other drug abuse/dependence? Yes.  3B. Number of times(lifetime) (over what period) 4 in a period of 15 years.  3C. Number of times completed treatment (lifetime) 3.  3D. During the past three years have you participated in outpatient and/or residential?  Yes.  3E. When and where? [facility] a year and half ago and the [facility] in June 2018.   3F. What was helpful? What was not? Structure, support, dual-diagnosis, and accountability.    4. Support group participation: Have you/do you attend support group meetings to " "reduce/stop your alcohol/drug use? How recently? What was your experience? Are you willing to restart? If the person has not participated, is he or she willing?      Yeah, AA not my cup of tea, struggled with it over the years and finding good meetings, had tried alternatives to AA as well    5. What would assist you in staying sober/straight?     \" Inpatient or outpateint treatment, online AA, family support and continues to take my medications\"    Dimension V Ratings   Relapse/Continued Use/Continued problem potential - The placing authority must use the criteria in Dimension V to determine a client s relapse, continued use, and continued problem potential.   RISK DESCRIPTIONS - Severity rating: 3 Client has poor recognition and understanding of relapse and recidivism issues and displays moderately high vulnerability for further substance use or mental health problems. Client has few coping skills and rarely applies coping skills.    REASONS SEVERITY WAS ASSIGNED - (What information did the person provide that indicates his or her understanding of relapse issues? What about the person s experience indicates how prone he or she is to relapse? What coping skills does the person have that decrease relapse potential?)      Pt reports anxiety often led to drinking in the past to manage, therapy and medication has been of little helpful in the past. Pt lacks coping skills to cope with triggers/cravings and needs more awareness surrounding relapse prevention and coping strattegies.         DIMENSION VI - Recovery Environment   1. Are you employed/attending school? Tell me about that.     I worked full-time at [employer] and currently on [employer].    2A. Describe a typical day; evening for you. Work, school, social, leisure, volunteer, spiritual practices. Include time spent obtaining, using, recovering from drugs or alcohol. (DSM)     I made myself a little daily shedule, shower, go outside, go for a walk, organized, " coloring, clean up.    Please describe what leisure activities have been associated with your substance abuse:     The patient denied having any leisure activities which had been associated with her substance abuse.    2B. How often do you spend more time than you planned using or use more than you planned? (DSM)      All day.    3. How important is using to your social connections? Do many of your family or friends use?     Not important. No.     4A. Are you currently in a significant relationship?     Yes.  4B. How long? A year and half           Please describe your significant other's use of mood altering chemicals? My partner drinks rarely.    4C. Sexual Orientation:     Heterosexual    5A. Who do you live with?      My mother    5B. Tell me about their alcohol/drug use and mental health issues.     No concerns.    5C. Are you concerned for your safety there? No    5D. Are you concerned about the safety of anyone else who lives with you? No    6A. Do you have children who live with you?     No    6B. Do you have children who do not live with you?     No    7A. Who supports you in making changes in your alcohol or drug use? What are they willing to do to support you? Who is upset or angry about you making changes in your alcohol or drug use? How big a problem is this for you?      My mother and boyfriend.    7B. This table is provided to record information about the person s relationships and available support It is not necessary to ask each item; only to get a comprehensive picture of their support system.  How often can you count on the following people when you need someone?   Partner / Spouse Always supportive   Parent(s)/Aunt(s)/Uncle(s)/Grandparents Always supportive   Sibling(s)/Cousin(s) Always supportive   Child(ray) The patient doesn't have any children.   Other relative(s) The patient doesn't have any other relatives.   Friend(s)/neighbor(s) Always supportive   Child(ray) s father(s)/mother(s) The  patient doesn't have any children.   Support group member(s) The patient denied having any current involvement with 12-step or other support group meetings.   Community of ellen members Usually supportive   /counselor/therapist/healer Usually supportive   Other (specify) No     8A. What is your current living situation?     Patient lives with her mother.    8B. What is your long term plan for where you will be living?     NA    8C. Tell me about your living environment/neighborhood? Ask enough follow up questions to determine safety, criminal activity, availability of alcohol and drugs, supportive or antagonistic to the person making changes.      No concerns.    9. Criminal justice history: Gather current/recent history and any significant history related to substance use--Arrests? Convictions? Circumstances? Alcohol or drug involvement? Sentences? Still on probation or parole? Expectations of the court? Current court order? Any sex offenses - lifetime? What level? (DSM)    DUI 5 years ago-interlock for year, community service.    10. What obstacles exist to participating in treatment? (Time off work, childcare, funding, transportation, pending snf time, living situation)     The patient denied having any obstacles for participating in substance abuse treatment.    Dimension VI Ratings   Recovery environment - The placing authority must use the criteria in Dimension VI to determine a client s recovery environment.   RISK DESCRIPTIONS - Severity ratin Client is engaged in structured, meaningful activity, but peers, family, significant other, and living environment are unsupportive, or there is criminal justice involvement by the client or among the client's peers, significant others, or in the client's living environment.    REASONS SEVERITY WAS ASSIGNED - (What support does the person have for making changes? What structure/stability does the person have in his or her daily life that will increase  the likelihood that changes can be sustained? What problems exist in the person s environment that will jeopardize getting/staying clean and sober?)      Patient resides with her mother, currently in a serious relationship and has supportive family and friends and boyfriend. Pt has a full-time job and has a few vocational degrees. Pt has minimal structure or meaningful activities, reports having experience with AA, however does not like the 12 steps but is willing to seek alternatives to AA/NA and subject herself to accountability. Pt reports DUI 5 years ago but does not have any current legal issue at this time.           Client Choice/Exceptions   Would you like services specific to language, age, gender, culture, Scientologist preference, race, ethnicity, sexual orientation or disability?  No    What particular treatment choices and options would you like to have? Outpatient    Do you have a preference for a particular treatment program? [facility].    Criteria for Diagnosis     Criteria for Diagnosis  DSM-5 Criteria for Substance Use Disorder  Instructions: Determine whether the client currently meets the criteria for Substance Use Disorder using the diagnostic criteria in the DSM-V pp.[phone]. Current means during the most recent 12 months outside a facility that controls access to substances    Category of Substance Severity (ICD-10 Code / DSM 5 Code)     Alcohol Use Disorder Severe  (10.20) (303.90)   Cannabis Use Disorder Mild  (F12.10) (305.20)   Hallucinogen Use Disorder The patient does not meet the criteria for a Hallucinogen use disorder.   Inhalant Use Disorder The patient does not meet the criteria for an Inhalant use disorder.   Opioid Use Disorder The patient does not meet the criteria for an Opioid use disorder.   Sedative, Hypnotic, or Anxiolytic Use Disorder The patient does not meet the criteria for a Sedative/Hypnotic use disorder.   Stimulant Related Disorder The patient does not meet the criteria  for a Stimulant use disorder.   Tobacco Use Disorder Moderate   (F17.200) (305.1)   Other (or unknown) Substance Use Disorder The patient does not meet the criteria for a Other (or unknown) Substance use disorder.       Collateral Contact Summary   Number of contacts made: NA    Contact with referring person:  No    If court related records were reviewed, summarize here: No court records had been reviewed at the time of this documentation.    Information from collateral contacts supported/largely agreed with information from the client and associated risk ratings.      Rule 25 Assessment Summary and Plan   's Recommendation    Complete an IOP/OP MICD Treatment Program at [facility].       Collateral Contacts     Name:    NA   Relationship:    NA   Phone Number:    NA Releases:    No     NA      Collateral Contacts     Name:    NA   Relationship:    NA   Phone Number:    NA   Releases:    No     NA    ollateral Contacts      A problematic pattern of alcohol/drug use leading to clinically significant impairment or distress, as manifested by at least two of the following, occurring within a 12-month period:    1.) Alcohol/drug is often taken in larger amounts or over a longer period than was intended.  4.) Craving, or a strong desire or urge to use alcohol/drug  5.) Recurrent alcohol/drug use resulting in a failure to fulfill major role obligations at work, school or home.  6.) Continued alcohol use despite having persistent or recurrent social or interpersonal problems caused or exacerbated by the effects of alcohol/drug.  8.) Recurrent alcohol/drug use in situations in which it is physically hazardous.  9.) Alcohol/drug use is continued despite knowledge of having a persistent or recurrent physical or psychological problem that is likely to have been caused or exacerbated by alcohol.  10.) Tolerance, as defined by either of the following: A need for markedly increased amounts of alcohol/drug to achieve  intoxication or desired effect.  11.) Withdrawal, as manifested by either of the following: Alcohol/drug (or a closely related substance, such as a benzodiazepine) is taken to relieve or avoid withdrawal symptoms.      Specify if: In early remission:  After full criteria for alcohol/drug use disorder were previously met, none of the criteria for alcohol/drug use disorder have been met for at least 3 months but for less than 12 months (with the exception that Criterion A4,  Craving or a strong desire or urge to use alcohol/drug  may be met).     In sustained remission:   After full criteria for alcohol use disorder were previously met, non of the criteria for alcohol/drug use disorder have been met at any time during a period of 12 months or longer (with the exception that Criterion A4,  Craving or strong desire or urge to use alcohol/drug  may be met).   Specify if:   This additional specifier is used if the individual is in an environment where access to alcohol is restricted.    Mild: Presence of 2-3 symptoms  Moderate: Presence of 4-5 symptoms  Severe: Presence of 6 or more symptoms   oral
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
## 2020-05-13 ASSESSMENT — ANXIETY QUESTIONNAIRES: GAD7 TOTAL SCORE: 11

## 2020-05-13 NOTE — PROGRESS NOTES
Due to COVID-19, Patient has given verbal consent to her therapist Shaye Potter, and her mother Katia. Patient has also requested her assessment be sent to Shaye and her therapist the same day of the assessment which was faxed the next day.

## 2020-07-13 ENCOUNTER — OFFICE VISIT (OUTPATIENT)
Dept: FAMILY MEDICINE | Facility: CLINIC | Age: 35
End: 2020-07-13
Payer: COMMERCIAL

## 2020-07-13 VITALS — SYSTOLIC BLOOD PRESSURE: 122 MMHG | DIASTOLIC BLOOD PRESSURE: 70 MMHG

## 2020-07-13 DIAGNOSIS — B36.0 TINEA VERSICOLOR: Primary | ICD-10-CM

## 2020-07-13 PROCEDURE — 99213 OFFICE O/P EST LOW 20 MIN: CPT | Performed by: FAMILY MEDICINE

## 2020-07-13 RX ORDER — FLUCONAZOLE 150 MG/1
TABLET ORAL
Qty: 4 TABLET | Refills: 0 | Status: SHIPPED | OUTPATIENT
Start: 2020-07-13 | End: 2020-10-28

## 2020-07-13 NOTE — PROGRESS NOTES
[facility] - PRIMARY CARE SKIN    CC: Lesion(s)  SUBJECTIVE:   Page Moreira is a(n) 35 year old female who presents to clinic today because of rash on the chest and abdomen.    Issue One: spots on the chest and upper abdomen. Had similar rash in high school. No itching    Personal Medical History  Skin cancer: NO  Eczema Psoriasis Lupus   NO NO NO   Other:       .    Family Medical History  Skin cancer: NO  Eczema Psoriasis Lupus   NO sister NO   Other: .      Occupation:  ().    Refer to electronic medical record (EMR) for past medical history and medications.      ROS: 14 point review of systems was negative except the symptoms listed above in the HPI.        OBJECTIVE:   GENERAL: healthy, alert and no distress.  HEENT: PERRL. Conjunctiva, sclera clear.  SKIN: Gallardo Skin Type - I.  Trunk examined. The dermatoscope was used to help evaluate pigmented lesions.  Skin Pertinent Findings:  Chest, back and abdomen- scattered salmon macular eruption    ASSESSMENT:     Encounter Diagnosis   Name Primary?     Tinea versicolor Yes         PLAN:   Patient Instructions   Fluconazole 300 mg once per week for two weeks        TT: 15 minutes.  CT: 10 minutes.

## 2020-07-13 NOTE — LETTER
7/13/2020         RE: Page Moreira  [id number]  [facility] [address]        Dear Colleague,    Thank you for referring your patient, Page Moreira, to the [facility]. Please see a copy of my visit note below.    [facility] - PRIMARY CARE SKIN    CC: Lesion(s)  SUBJECTIVE:   Page Moreira is a(n) 35 year old female who presents to clinic today because of rash on the chest and abdomen.    Issue One: spots on the chest and upper abdomen. Had similar rash in high school. No itching    Personal Medical History  Skin cancer: NO  Eczema Psoriasis Lupus   NO NO NO   Other:       .    Family Medical History  Skin cancer: NO  Eczema Psoriasis Lupus   NO sister NO   Other: .      Occupation:  ().    Refer to electronic medical record (EMR) for past medical history and medications.      ROS: 14 point review of systems was negative except the symptoms listed above in the HPI.        OBJECTIVE:   GENERAL: healthy, alert and no distress.  HEENT: PERRL. Conjunctiva, sclera clear.  SKIN: Agllardo Skin Type - I.  Trunk examined. The dermatoscope was used to help evaluate pigmented lesions.  Skin Pertinent Findings:  Chest, back and abdomen- scattered salmon macular eruption    ASSESSMENT:     Encounter Diagnosis   Name Primary?     Tinea versicolor Yes         PLAN:   Patient Instructions   Fluconazole 300 mg once per week for two weeks        TT: 15 minutes.  CT: 10 minutes.        Again, thank you for allowing me to participate in the care of your patient.        Sincerely,        Meg Voss MD

## 2020-08-04 ENCOUNTER — ALLIED HEALTH/NURSE VISIT (OUTPATIENT)
Dept: NURSING | Facility: CLINIC | Age: 35
End: 2020-08-04
Payer: COMMERCIAL

## 2020-08-04 DIAGNOSIS — Z30.9 CONTRACEPTIVE MANAGEMENT: Primary | ICD-10-CM

## 2020-08-04 PROCEDURE — 99207 ZZC NO CHARGE NURSE ONLY: CPT

## 2020-08-04 PROCEDURE — 96372 THER/PROPH/DIAG INJ SC/IM: CPT

## 2020-08-04 RX ADMIN — MEDROXYPROGESTERONE ACETATE 150 MG: 150 INJECTION, SUSPENSION INTRAMUSCULAR at 13:25

## 2020-08-04 NOTE — NURSING NOTE
Clinic Administered Medication Documentation      Depo Provera Documentation    URINE HCG: not indicated    Depo-Provera Standing Order inclusion/exclusion criteria reviewed.   Patient meets: inclusion criteria     BP: Data Unavailable  LAST PAP/EXAM: No results found for: PAP    Prior to injection, verified patient identity using patient's name and date of birth. Medication was administered. Please see MAR and medication order for additional information.     Was entire vial of medication used? Yes  Vial/Syringe: Single dose vial  Expiration Date:  3/2021    Patient instructed to remain in clinic for 15 minutes and report any adverse reaction to staff immediately .  NEXT INJECTION DUE: 10/20/20 - 11/3/20    Eladia BEDOYA R.N.

## 2020-08-30 DIAGNOSIS — I10 ESSENTIAL HYPERTENSION: ICD-10-CM

## 2020-08-30 NOTE — LETTER
September 15, 2020      Page Moreira  [address]  [place] [address]          Our records indicates that it is time for you to be seen for an office visit with your doctor.    We have sent one 3 month refill for your medication and request an appointment prior to the next refill    Please call our office at [phone] to schedule an appointment for a physical .     Please disregard this notice if you have already made an appointment.     If you are no longer a [facility] patient; Please contact us and let us know that as well. You will also need to the let the pharmacy know the name of your current Provider so that they can send future request to them.       Sincerely,      [facility] Deuel Staff

## 2020-08-31 RX ORDER — LISINOPRIL AND HYDROCHLOROTHIAZIDE 20; 25 MG/1; MG/1
TABLET ORAL
Qty: 90 TABLET | Refills: 0 | Status: SHIPPED | OUTPATIENT
Start: 2020-08-31 | End: 2020-10-28

## 2020-08-31 NOTE — TELEPHONE ENCOUNTER
"Failed K+    Requested Prescriptions   Pending Prescriptions Disp Refills     lisinopril-hydrochlorothiazide (ZESTORETIC) 20-25 MG tablet [Pharmacy Med Name: LISINOPRIL-HCTZ 20-25 MG TAB] 90 tablet 1     Sig: TAKE 1 TABLET BY MOUTH EVERY DAY       Diuretics (Including Combos) Protocol Failed - 8/30/2020 11:55 AM        Failed - Normal serum potassium on file in past 12 months     Recent Labs   Lab Test 05/02/20  0611   POTASSIUM 3.0*                    Passed - Blood pressure under 140/90 in past 12 months     BP Readings from Last 3 Encounters:   07/13/20 122/70   05/02/20 (!) 158/119   04/28/20 (!) 142/102                 Passed - Recent (12 mo) or future (30 days) visit within the authorizing provider's specialty     Patient has had an office visit with the authorizing provider or a provider within the authorizing providers department within the previous 12 mos or has a future within next 30 days. See \"Patient Info\" tab in inbasket, or \"Choose Columns\" in Meds & Orders section of the refill encounter.              Passed - Medication is active on med list        Passed - Patient is age 18 or older        Passed - No active pregancy on record        Passed - Normal serum creatinine on file in past 12 months     Recent Labs   Lab Test 05/02/20  0611   CR 0.60              Passed - Normal serum sodium on file in past 12 months     Recent Labs   Lab Test 05/02/20  0611                 Passed - No positive pregnancy test in past 12 months       ACE Inhibitors (Including Combos) Protocol Failed - 8/30/2020 11:55 AM        Failed - Normal serum potassium on file in past 12 months     Recent Labs   Lab Test 05/02/20  0611   POTASSIUM 3.0*             Passed - Blood pressure under 140/90 in past 12 months     BP Readings from Last 3 Encounters:   07/13/20 122/70   05/02/20 (!) 158/119   04/28/20 (!) 142/102                 Passed - Recent (12 mo) or future (30 days) visit within the authorizing provider's specialty " "    Patient has had an office visit with the authorizing provider or a provider within the authorizing providers department within the previous 12 mos or has a future within next 30 days. See \"Patient Info\" tab in inbasket, or \"Choose Columns\" in Meds & Orders section of the refill encounter.              Passed - Medication is active on med list        Passed - Patient is age 18 or older        Passed - No active pregnancy on record        Passed - Normal serum creatinine on file in past 12 months     Recent Labs   Lab Test 05/02/20  0611   CR 0.60       Ok to refill medication if creatinine is low          Passed - No positive pregnancy test within past 12 months             "

## 2020-09-14 ENCOUNTER — TELEPHONE (OUTPATIENT)
Dept: FAMILY MEDICINE | Facility: CLINIC | Age: 35
End: 2020-09-14

## 2020-09-14 ENCOUNTER — MYC MEDICAL ADVICE (OUTPATIENT)
Dept: FAMILY MEDICINE | Facility: CLINIC | Age: 35
End: 2020-09-14

## 2020-09-14 DIAGNOSIS — M54.50 ACUTE LOW BACK PAIN, UNSPECIFIED BACK PAIN LATERALITY, UNSPECIFIED WHETHER SCIATICA PRESENT: Primary | ICD-10-CM

## 2020-09-14 RX ORDER — METHYLPREDNISOLONE 4 MG
TABLET, DOSE PACK ORAL
Qty: 21 TABLET | Refills: 0 | Status: SHIPPED | OUTPATIENT
Start: 2020-09-14

## 2020-09-14 RX ORDER — CYCLOBENZAPRINE HCL 5 MG
5-10 TABLET ORAL 3 TIMES DAILY PRN
Qty: 30 TABLET | Refills: 1 | Status: SHIPPED | OUTPATIENT
Start: 2020-09-14 | End: 2020-10-28

## 2020-09-14 NOTE — TELEPHONE ENCOUNTER
"Pt calling states she \"tweaked\" her back yesterday cleaning out the garage.  Went to a chiropractor today at [facility] for back pain and they would like pt to do PT and start a medrol dose vivian but would like PCP to prescribe the med.  Pt also states [facility] needs an order/referral for pt to do PT.    Fax referral/order to [facility] at [phone].    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    Pharmacy pended.    Stefanie MOTTA RN  EP Triage    "

## 2020-09-14 NOTE — TELEPHONE ENCOUNTER
Please see Resonate message and advise. Thank you very much.    Hillary Travis RN, BSN  [facility]

## 2020-09-14 NOTE — TELEPHONE ENCOUNTER
General Call:     Who is calling:  Pt    Reason for Call:  Pt seeing chiropractor, Dr Hernandez.  Would like a medrol dose pack ,  Pt uses [facility] [address].   Please call  Pt     What are your questions or concerns:  na    Date of last appointment with provider: malorie    Okay to leave a detailed message:Yes at Home number on file [phone] (home)

## 2020-09-14 NOTE — TELEPHONE ENCOUNTER
Pt notified of Dr. Jeffery's reply below and will do an E-visit.    Stefanie MOTTA RN  EP Triage

## 2020-09-18 ENCOUNTER — MYC MEDICAL ADVICE (OUTPATIENT)
Dept: FAMILY MEDICINE | Facility: CLINIC | Age: 35
End: 2020-09-18

## 2020-09-21 NOTE — TELEPHONE ENCOUNTER
Can you please fax the order for PT placed 9/14 to [facility]?  Thank you.     Gisselle Jeffery MD

## 2020-09-21 NOTE — TELEPHONE ENCOUNTER
Please see Fixetude message and advise.      Thank you,  Jammie ZHURN BSN  [facility]  [phone]

## 2020-10-20 ENCOUNTER — ALLIED HEALTH/NURSE VISIT (OUTPATIENT)
Dept: NURSING | Facility: CLINIC | Age: 35
End: 2020-10-20
Payer: COMMERCIAL

## 2020-10-20 DIAGNOSIS — Z30.9 CONTRACEPTIVE MANAGEMENT: Primary | ICD-10-CM

## 2020-10-20 PROCEDURE — 99207 PR NO CHARGE NURSE ONLY: CPT

## 2020-10-20 PROCEDURE — 96372 THER/PROPH/DIAG INJ SC/IM: CPT

## 2020-10-20 RX ADMIN — MEDROXYPROGESTERONE ACETATE 150 MG: 150 INJECTION, SUSPENSION INTRAMUSCULAR at 13:18

## 2020-10-20 NOTE — PROGRESS NOTES
Follow Up Injection    Patient returning during stated date range given at previous visit: Yes      If here at the correct interval:   BP Readings from Last 1 Encounters:   07/13/20 122/70     Wt Readings from Last 1 Encounters:   05/02/20 88.8 kg (195 lb 12.8 oz)       Last Pap/exam date:       Side effects or problems with last injection?  No.  Date range is given to patient for next dose: January 5-19 2021    See Medication Note for administration information    Staff Sig: Samara Sargent CMA

## 2020-10-28 ENCOUNTER — VIRTUAL VISIT (OUTPATIENT)
Dept: FAMILY MEDICINE | Facility: CLINIC | Age: 35
End: 2020-10-28
Payer: COMMERCIAL

## 2020-10-28 DIAGNOSIS — F33.1 MODERATE EPISODE OF RECURRENT MAJOR DEPRESSIVE DISORDER (H): Primary | ICD-10-CM

## 2020-10-28 DIAGNOSIS — I10 ESSENTIAL HYPERTENSION: ICD-10-CM

## 2020-10-28 DIAGNOSIS — K50.919 CROHN'S DISEASE WITH COMPLICATION, UNSPECIFIED GASTROINTESTINAL TRACT LOCATION (H): ICD-10-CM

## 2020-10-28 DIAGNOSIS — F41.1 GENERALIZED ANXIETY DISORDER: ICD-10-CM

## 2020-10-28 DIAGNOSIS — R11.0 NAUSEA: ICD-10-CM

## 2020-10-28 PROCEDURE — 99214 OFFICE O/P EST MOD 30 MIN: CPT | Mod: TEL | Performed by: INTERNAL MEDICINE

## 2020-10-28 RX ORDER — ONDANSETRON 4 MG/1
4 TABLET, ORALLY DISINTEGRATING ORAL EVERY 8 HOURS PRN
Qty: 10 TABLET | Refills: 0 | Status: SHIPPED | OUTPATIENT
Start: 2020-10-28 | End: 2021-01-22

## 2020-10-28 RX ORDER — NALTREXONE 380 MG
KIT INTRAMUSCULAR
COMMUNITY
Start: 2020-10-12

## 2020-10-28 RX ORDER — MIRTAZAPINE 15 MG/1
15 TABLET, FILM COATED ORAL
COMMUNITY
Start: 2020-10-18

## 2020-10-28 RX ORDER — LISINOPRIL AND HYDROCHLOROTHIAZIDE 20; 25 MG/1; MG/1
1 TABLET ORAL DAILY
Qty: 90 TABLET | Refills: 0 | Status: SHIPPED | OUTPATIENT
Start: 2020-10-28 | End: 2021-06-11

## 2020-10-28 NOTE — PROGRESS NOTES
"Page Moreira is a 35 year old female who is being evaluated via a billable telephone visit.      The patient has been notified of following:     \"This telephone visit will be conducted via a call between you and your physician/provider. We have found that certain health care needs can be provided without the need for a physical exam.  This service lets us provide the care you need with a short phone conversation.  If a prescription is necessary we can send it directly to your pharmacy.  If lab work is needed we can place an order for that and you can then stop by our lab to have the test done at a later time.    Telephone visits are billed at different rates depending on your insurance coverage. During this emergency period, for some insurers they may be billed the same as an in-person visit.  Please reach out to your insurance provider with any questions.    If during the course of the call the physician/provider feels a telephone visit is not appropriate, you will not be charged for this service.\"    Patient has given verbal consent for Telephone visit?  Yes    What phone number would you like to be contacted at? [phone]    How would you like to obtain your AVS? Mail a copy    Subjective     Page Moreira is a 35 year old female who presents via phone visit today for the following health issues:    HPI     Hypertension Follow-up  Taking lisinopril-hydrochlorothiazide regularly.  Doesn't check often but when she has is at goal.  Diet hasn't been great, with quitting alcohol she is craving a lot of sweets. No heart racing.      Do you check your blood pressure regularly outside of the clinic? No     Are you following a low salt diet? No    Are your blood pressures ever more than 140 on the top number (systolic) OR more   than 90 on the bottom number (diastolic), for example 140/90? NA      Depression and Anxiety Follow-Up    How are you doing with your depression since your last visit? Improved     How are you " doing with your anxiety since your last visit?  Improved     Are you having other symptoms that might be associated with depression or anxiety? No    Have you had a significant life event? No     Do you have any concerns with your use of alcohol or other drugs? No  Page is seeing a psychiatrist for medication management.  She was started on remeron for insomnia, this has really helped her sleeping, but this has made her gain weight.  Also on Vivitrol, which is beneficial. She meets with her long-term counselor.  Doing outpatient group therapy, currently twice a week zoom meeting, may go down to once a week.  She has had some bumps along the road with her sobriety, but so far is at 5 weeks sober!      She has some questions about medical marijuana.  She has used this a few times to help with sleep and other issues and has gotten benefit.  She is open about this with her mental health providers.  Given that Crohn's disease is a qualifying condition for medical marijuana, she is wondering about getting certified for medical marijuana.           Social History     Tobacco Use     Smoking status: Current Every Day Smoker     Packs/day: 1.00     Types: Cigarettes     Smokeless tobacco: Never Used   Substance Use Topics     Alcohol use: Yes     Comment: 1.75 l q 2 days     Drug use: Not Currently     Types: Marijuana     PHQ 8/26/2019 5/4/2020 5/12/2020   PHQ-9 Total Score 8 17 14   Q9: Thoughts of better off dead/self-harm past 2 weeks Not at all Several days Several days     KIERRA-7 SCORE 8/26/2019 5/4/2020 5/12/2020   Total Score 6 16 11     Requested that phq9/kierra be sent via [employer]. Did not want to fill out while on the phone.         Review of Systems   Cv, pulm, gi, psych reviewed,  otherwise negative unless noted above.         Objective          Vitals:  No vitals were obtained today due to virtual visit.    healthy, alert and no distress  PSYCH: Alert and oriented times 3; coherent speech, normal   rate and  volume, able to articulate logical thoughts, able   to abstract reason, no tangential thoughts, no hallucinations   or delusions  Her affect is normal  RESP: No cough, no audible wheezing, able to talk in full sentences  Remainder of exam unable to be completed due to telephone visits             Assessment/Plan:    Assessment & Plan     Moderate episode of recurrent major depressive disorder (H)  Refill ordered. Doing well, seeing psychiatrist, group therapy, and personal counselor. Recommended she discuss the weight gain side effect of remeron with her psychiatrist.    - sertraline (ZOLOFT) 50 MG tablet; Take 1 tablet (50 mg) by mouth daily    Generalized anxiety disorder  - sertraline (ZOLOFT) 50 MG tablet; Take 1 tablet (50 mg) by mouth daily    Essential hypertension  Refill ordered   - lisinopril-hydrochlorothiazide (ZESTORETIC) 20-25 MG tablet; Take 1 tablet by mouth daily    Crohn's disease with complication, unspecified gastrointestinal tract location (H)  Gave a couple numbers to try for medical marijuana certification   - ondansetron (ZOFRAN ODT) 4 MG ODT tab; Take 1 tablet (4 mg) by mouth every 8 hours as needed for nausea    Nausea  Refill ordered   - ondansetron (ZOFRAN ODT) 4 MG ODT tab; Take 1 tablet (4 mg) by mouth every 8 hours as needed for nausea     Tobacco Cessation:   reports that she has been smoking cigarettes. She has been smoking about 1.00 pack per day. She has never used smokeless tobacco.                   Return in about 6 months (around 4/28/2021) for Physical Exam.    Gisselle Jeffery MD  [facility]    Phone call duration:  14 minutes

## 2020-11-10 ENCOUNTER — OFFICE VISIT (OUTPATIENT)
Dept: FAMILY MEDICINE | Facility: CLINIC | Age: 35
End: 2020-11-10
Payer: COMMERCIAL

## 2020-11-10 VITALS
TEMPERATURE: 98.4 F | HEART RATE: 107 BPM | WEIGHT: 209 LBS | RESPIRATION RATE: 18 BRPM | OXYGEN SATURATION: 99 % | DIASTOLIC BLOOD PRESSURE: 66 MMHG | BODY MASS INDEX: 34.78 KG/M2 | SYSTOLIC BLOOD PRESSURE: 126 MMHG

## 2020-11-10 DIAGNOSIS — R10.12 LEFT UPPER QUADRANT PAIN: Primary | ICD-10-CM

## 2020-11-10 DIAGNOSIS — R10.9 LEFT FLANK PAIN: ICD-10-CM

## 2020-11-10 LAB
ALBUMIN UR-MCNC: NEGATIVE MG/DL
APPEARANCE UR: CLEAR
BILIRUB UR QL STRIP: NEGATIVE
COLOR UR AUTO: YELLOW
GLUCOSE UR STRIP-MCNC: NEGATIVE MG/DL
HGB UR QL STRIP: NEGATIVE
KETONES UR STRIP-MCNC: NEGATIVE MG/DL
LEUKOCYTE ESTERASE UR QL STRIP: NEGATIVE
LIPASE SERPL-CCNC: 154 U/L (ref 73–393)
NITRATE UR QL: NEGATIVE
NON-SQ EPI CELLS #/AREA URNS LPF: NORMAL /LPF
PH UR STRIP: 6.5 PH (ref 5–7)
RBC #/AREA URNS AUTO: NORMAL /HPF
SOURCE: NORMAL
SP GR UR STRIP: >1.03 (ref 1–1.03)
UROBILINOGEN UR STRIP-ACNC: 0.2 EU/DL (ref 0.2–1)
WBC #/AREA URNS AUTO: NORMAL /HPF

## 2020-11-10 PROCEDURE — 81001 URINALYSIS AUTO W/SCOPE: CPT | Performed by: PHYSICIAN ASSISTANT

## 2020-11-10 PROCEDURE — 36415 COLL VENOUS BLD VENIPUNCTURE: CPT | Performed by: PHYSICIAN ASSISTANT

## 2020-11-10 PROCEDURE — 80053 COMPREHEN METABOLIC PANEL: CPT | Performed by: PHYSICIAN ASSISTANT

## 2020-11-10 PROCEDURE — 99213 OFFICE O/P EST LOW 20 MIN: CPT | Performed by: PHYSICIAN ASSISTANT

## 2020-11-10 PROCEDURE — 83690 ASSAY OF LIPASE: CPT | Performed by: PHYSICIAN ASSISTANT

## 2020-11-10 ASSESSMENT — ENCOUNTER SYMPTOMS
EYES NEGATIVE: 1
PSYCHIATRIC NEGATIVE: 1
ENDOCRINE NEGATIVE: 1
CARDIOVASCULAR NEGATIVE: 1
RESPIRATORY NEGATIVE: 1
CONSTITUTIONAL NEGATIVE: 1
NEUROLOGICAL NEGATIVE: 1

## 2020-11-10 ASSESSMENT — PATIENT HEALTH QUESTIONNAIRE - PHQ9: SUM OF ALL RESPONSES TO PHQ QUESTIONS 1-9: 7

## 2020-11-10 NOTE — PROGRESS NOTES
Subjective     Page Moreira is a 35 year old female who presents to clinic today for the following health issues:    HPI         Abdominal/Flank Pain  Onset/Duration: 12 hours  Description:   Character: Sharp  Location: left upper quadrant  Radiation: None and Back  Intensity: 7/10  Progression of Symptoms:  consistant  Accompanying Signs & Symptoms:  Fever/Chills: no  Gas/Bloating: no  Nausea: no  Vomitting: no  Diarrhea: no  Constipation: no  Dysuria or Hematuria: no  History:   Trauma: no  Previous similar pain: no  Previous tests done: none  Precipitating factors:   Does the pain change with:     Food: no    Bowel Movement: no    Urination: no   Other factors:  Unable to get comfortable  Therapies tried and outcome: None  No LMP recorded. Patient has had an injection.    Feels like a pole going from the LUQ to the left flank  No hx kidney stones, no urinary sx, no hx UTI  No nausea, eating - unclear if related  No history of heartburn  No rash  No blood in the stool, no dark black stools   Not consistent with a Crohn's flare          Past Medical History:   Diagnosis Date     Anxiety      Crohn disease (H)      ETOH abuse      Hypertension      Substance abuse (H)        Past Surgical History:   Procedure Laterality Date     COLONOSCOPY       ORTHOPEDIC SURGERY         Family History   Problem Relation Age of Onset     Substance Abuse Father        Social History     Tobacco Use     Smoking status: Current Every Day Smoker     Packs/day: 1.00     Types: Cigarettes     Smokeless tobacco: Never Used   Substance Use Topics     Alcohol use: Yes     Comment: 1.75 l q 2 days        Current Outpatient Medications   Medication     lisinopril-hydrochlorothiazide (ZESTORETIC) 20-25 MG tablet     medroxyPROGESTERone (DEPO-PROVERA) 150 MG/ML IM injection     mirtazapine (REMERON) 15 MG tablet     naltrexone (DEPADE/REVIA) 50 MG tablet     sertraline (ZOLOFT) 50 MG tablet     VIVITROL 380 MG SUSR     albuterol (PROAIR  HFA/PROVENTIL HFA/VENTOLIN HFA) 108 (90 Base) MCG/ACT inhaler     fluticasone (FLOVENT HFA) 110 MCG/ACT inhaler     magic mouthwash suspension, diphenhydrAMINE, lidocaine, aluminum-magnesium & simethicone, (FIRST-MOUTHWASH BLM) compounding kit     methylPREDNISolone (MEDROL DOSEPAK) 4 MG tablet therapy pack     ondansetron (ZOFRAN ODT) 4 MG ODT tab     No current facility-administered medications for this visit.         No Known Allergies       Review of Systems   Constitutional: Negative.    HENT: Negative.    Eyes: Negative.    Respiratory: Negative.    Cardiovascular: Negative.    Endocrine: Negative.    Skin: Negative.    Neurological: Negative.    Psychiatric/Behavioral: Negative.          Objective    /66   Pulse 107   Temp 98.4  F (36.9  C) (Tympanic)   Resp 18   Wt 94.8 kg (209 lb)   SpO2 99%   BMI 34.78 kg/m    Physical Exam  Constitutional:       General: She is not in acute distress.     Appearance: She is well-developed. She is not diaphoretic.   HENT:      Head: Normocephalic.      Right Ear: External ear normal.      Left Ear: External ear normal.      Nose: Nose normal.   Eyes:      Conjunctiva/sclera: Conjunctivae normal.   Neck:      Musculoskeletal: Normal range of motion.   Pulmonary:      Effort: Pulmonary effort is normal.   Abdominal:      General: There is no distension.      Palpations: Abdomen is soft. There is no mass.      Tenderness: There is abdominal tenderness in the left upper quadrant. There is no right CVA tenderness, left CVA tenderness, guarding or rebound.   Musculoskeletal:      Thoracic back: She exhibits tenderness (left paraspinal muscles). She exhibits normal range of motion, no bony tenderness and no swelling.   Neurological:      Mental Status: She is alert and oriented to person, place, and time.   Psychiatric:         Judgment: Judgment normal.         Diagnostic Test Results:  No results found for this or any previous visit (from the past 24 hour(s)).         Assessment & Plan   Problem List Items Addressed This Visit     None      Visit Diagnoses     Left upper quadrant pain    -  Primary    Relevant Orders    UA with Microscopic reflex to Culture    Comprehensive metabolic panel (BMP + Alb, Alk Phos, ALT, AST, Total. Bili, TP) (Completed)    Lipase (Completed)    Left flank pain        Relevant Orders    UA with Microscopic reflex to Culture    Comprehensive metabolic panel (BMP + Alb, Alk Phos, ALT, AST, Total. Bili, TP) (Completed)    Lipase (Completed)         Discussed labs and reason for ordering  Red flags - new rash, hard and painful abdomen     There are no Patient Instructions on file for this visit.    Return if symptoms worsen or fail to improve.    Rebecca Hankins PA-C  [facility]

## 2020-11-11 ENCOUNTER — MYC MEDICAL ADVICE (OUTPATIENT)
Dept: FAMILY MEDICINE | Facility: CLINIC | Age: 35
End: 2020-11-11

## 2020-11-11 LAB
ALBUMIN SERPL-MCNC: 3.8 G/DL (ref 3.4–5)
ALP SERPL-CCNC: 59 U/L (ref 40–150)
ALT SERPL W P-5'-P-CCNC: 37 U/L (ref 0–50)
ANION GAP SERPL CALCULATED.3IONS-SCNC: 8 MMOL/L (ref 3–14)
AST SERPL W P-5'-P-CCNC: 23 U/L (ref 0–45)
BILIRUB SERPL-MCNC: 0.4 MG/DL (ref 0.2–1.3)
BUN SERPL-MCNC: 9 MG/DL (ref 7–30)
CALCIUM SERPL-MCNC: 9.3 MG/DL (ref 8.5–10.1)
CHLORIDE SERPL-SCNC: 103 MMOL/L (ref 94–109)
CO2 SERPL-SCNC: 28 MMOL/L (ref 20–32)
CREAT SERPL-MCNC: 0.51 MG/DL (ref 0.52–1.04)
GFR SERPL CREATININE-BSD FRML MDRD: >90 ML/MIN/{1.73_M2}
GLUCOSE SERPL-MCNC: 84 MG/DL (ref 70–99)
POTASSIUM SERPL-SCNC: 3.5 MMOL/L (ref 3.4–5.3)
PROT SERPL-MCNC: 7.8 G/DL (ref 6.8–8.8)
SODIUM SERPL-SCNC: 139 MMOL/L (ref 133–144)

## 2020-11-11 NOTE — RESULT ENCOUNTER NOTE
Page    Your lab tests are complete and I have reviewed the results.     CMP looks good too!    I think its likely muscular.  Watch for those red flags.  Otherwise: rest, stretch, ice, ok to take muscle relaxers as needed.     If you have any questions or concerns, please feel free to call or send a Noosh message.    Sincerely,  Po Hankins PA-C

## 2020-11-11 NOTE — RESULT ENCOUNTER NOTE
Page    Your lab tests are complete and I have reviewed the results.     So far, labs look good!      If you have any questions or concerns, please feel free to call or send a [employer] message.    Sincerely,  Po Hankins PA-C

## 2020-11-29 ENCOUNTER — HEALTH MAINTENANCE LETTER (OUTPATIENT)
Age: 35
End: 2020-11-29

## 2020-12-14 ENCOUNTER — TRANSFERRED RECORDS (OUTPATIENT)
Dept: HEALTH INFORMATION MANAGEMENT | Facility: CLINIC | Age: 35
End: 2020-12-14

## 2020-12-15 ENCOUNTER — MYC MEDICAL ADVICE (OUTPATIENT)
Dept: FAMILY MEDICINE | Facility: CLINIC | Age: 35
End: 2020-12-15

## 2020-12-15 ENCOUNTER — TELEPHONE (OUTPATIENT)
Dept: FAMILY MEDICINE | Facility: CLINIC | Age: 35
End: 2020-12-15

## 2020-12-15 ASSESSMENT — PATIENT HEALTH QUESTIONNAIRE - PHQ9
SUM OF ALL RESPONSES TO PHQ QUESTIONS 1-9: 3
SUM OF ALL RESPONSES TO PHQ QUESTIONS 1-9: 3
10. IF YOU CHECKED OFF ANY PROBLEMS, HOW DIFFICULT HAVE THESE PROBLEMS MADE IT FOR YOU TO DO YOUR WORK, TAKE CARE OF THINGS AT HOME, OR GET ALONG WITH OTHER PEOPLE: SOMEWHAT DIFFICULT

## 2020-12-15 NOTE — TELEPHONE ENCOUNTER
Pt is due now to update PHQ9.  Enclarity message sent to pt. Follow up end date 1/3/21.   PHQ-9 SCORE 8/26/2019 5/4/2020 11/10/2020   PHQ-9 Total Score 8 17 7   Some encounter information is confidential and restricted. Go to Review Flowsheets activity to see all data.     Jf BEARDEN, CMA

## 2020-12-16 ASSESSMENT — PATIENT HEALTH QUESTIONNAIRE - PHQ9: SUM OF ALL RESPONSES TO PHQ QUESTIONS 1-9: 3

## 2020-12-21 ENCOUNTER — VIRTUAL VISIT (OUTPATIENT)
Dept: FAMILY MEDICINE | Facility: OTHER | Age: 35
End: 2020-12-21
Payer: COMMERCIAL

## 2020-12-21 PROCEDURE — 99421 OL DIG E/M SVC 5-10 MIN: CPT | Performed by: PHYSICIAN ASSISTANT

## 2020-12-21 NOTE — PROGRESS NOTES
"Date: 2020 17:11:52  Clinician: Dora Acosta  Clinician NPI: [id number]  Patient: Page Moreira  Patient : 1985  Patient Address: [address], [place] [address]  Patient Phone: [phone]  Visit Protocol: URI  Patient Summary:  Page is a 35 year old ( : 1985 ) female who initiated a [facility] Visit for COVID-19 (Coronavirus) evaluation and screening. When asked the question \"Please sign me up to receive news, health information and promotions from [facility].\", Page responded \"No\".    When asked when her symptoms started, Page reported that she does not have any symptoms.   She denies having recent facial or sinus surgery in the past 60 days and taking antibiotic medication in the past month.    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 In the past 14 days, Page has worked or volunteered at a hospital or a clinic. 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   Page has not had a close contact with a laboratory-confirmed COVID-19 patient within the last 14 days.    Paeg has not been tested for COVID-19.   Pertinent medical history  Page typically gets a yeast infection when she takes antibiotics. She has used fluconazole (Diflucan) to treat previous yeast infections. 2 doses of fluconazole (Diflucan) has typically been needed for symptoms to resolve in the past.  She has not been told by her provider to avoid NSAIDs.   Page does not have diabetes. She denies having immunosuppressive conditions (e.g., chemotherapy, HIV, organ transplant, long-term use of steroids or other immunosuppressive medications, splenectomy). She denies having congestive heart failure and severe COPD. She does not have asthma.   Page does not need a return to " work/school note.   Page smokes or uses smokeless tobacco.   She denies pregnancy and denies breastfeeding. She does not menstruate.   Weight: 190 lbs    MEDICATIONS: mirtazapine oral, ALLERGIES: NKDA  Clinician Response:  Dear Page,   Based on the details you've shared, you are concerned about contact with someone who may have been exposed to coronavirus (COVID-19). Based on [facility] guidelines you do not need to be tested at this time.  Testing for people who do not have symptoms is only required in certain instances, including direct contact with a person who has tested positive for COVID-19, or for those who are traveling to locations where testing is required beforehand.  Please redo an [facility] visit or call your clinic if you think we missed needed information, your exposure information has changed, or you develop symptoms.  What are the symptoms of COVID-19?  The most common symptoms are cough, fever and trouble breathing. Less common symptoms include headache, body aches, fatigue (feeling very tired), chills, sore throat, stuffy or runny nose, diarrhea (loose poop), loss of taste or smell, belly pain, and nausea or vomiting (feeling sick to your stomach or throwing up).  Where can I get more information?  [facility] -- About COVID-19: [email or web address]  CDC -- What to Do If You're Sick: www.cdc.gov/coronavirus/2019-ncov/about/steps-when-sick.html  CDC -- Ending Home Isolation: www.cdc.gov/coronavirus/2019-ncov/hcp/disposition-in-home-patients.html  CDC -- Caring for Someone: www.cdc.gov/coronavirus/2019-ncov/if-you-are-sick/care-for-someone.html  [facility] -- Interim Guidance for Hospital Discharge to Home: www.health.[facility].[place]/diseases/coronavirus/hcp/hospdischarge.pdf  [facility] clinical trials (COVID-19 research studies): clinicalaffairs.[facility]/n-clinical-trials  Below are the COVID-19 hotlines at the [place] Department of Health ([facility]). Interpreters are  available.  For health questions: Call 094-549-1515 or 1-222.860.7545 (7 a.m. to 7 p.m.)  For questions about schools and childcare: Call 646-714-1120 or 1-157.945.1067 (7 a.m. to 7 p.m.)    Diagnosis: Worries  Diagnosis ICD: R45.82

## 2020-12-22 ENCOUNTER — VIRTUAL VISIT (OUTPATIENT)
Dept: FAMILY MEDICINE | Facility: OTHER | Age: 35
End: 2020-12-22
Payer: COMMERCIAL

## 2020-12-22 PROCEDURE — 99421 OL DIG E/M SVC 5-10 MIN: CPT | Performed by: PHYSICIAN ASSISTANT

## 2020-12-22 NOTE — PROGRESS NOTES
"Date: 2020 08:54:58  Clinician: Briseyda Husain  Clinician NPI: [id number]  Patient: Page Moreira  Patient : 1985  Patient Address: [facility], [place] [address]  Patient Phone: [phone]  Visit Protocol: URI  Patient Summary:  Page is a 35 year old ( : 1985 ) female who initiated a [facility] Visit for COVID-19 (Coronavirus) evaluation and screening. When asked the question \"Please sign me up to receive news, health information and promotions from [facility].\", Page responded \"No\".    When asked when her symptoms started, Page reported that she does not have any symptoms.   She denies having recent facial or sinus surgery in the past 60 days and taking antibiotic medication in the past month.    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 In the past 14 days, Page has worked or volunteered at a hospital or a clinic. 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   Page has had a close contact with a laboratory-confirmed COVID-19 patient in the last 14 days. She was not exposed at her work. She does not know when she was exposed to the laboratory-confirmed COVID-19 patient.   Additional information about contact with COVID-19 (Coronavirus) patient as reported by the patient (free text): TRAVELED LAST WEEKEN   Page is not living in the same household with the COVID-19 positive patient. She was not in an enclosed space for greater than 15 minutes with the COVID-19 patient.   Page has not been tested for COVID-19.   Pertinent medical history  Page typically gets a yeast infection when she takes antibiotics. She has used fluconazole (Diflucan) to treat previous yeast infections. 2 doses of fluconazole (Diflucan) has typically been needed for symptoms to resolve in the past.  " She has not been told by her provider to avoid NSAIDs.   Page does not have diabetes. She denies having immunosuppressive conditions (e.g., chemotherapy, HIV, organ transplant, long-term use of steroids or other immunosuppressive medications, splenectomy). She denies having congestive heart failure and severe COPD. She does not have asthma.   Page does not need a return to work/school note.   Page smokes or uses smokeless tobacco.   She denies pregnancy and denies breastfeeding. She does not menstruate.   Weight: 190 lbs  Reason for repeat visit for the same protocol within 24 hours:  I need a Covid test ordered ASAP  See the History of referred by protocol and completed visits section for details on previous visits (visits currently in queue to be diagnosed will not appear in this section).    MEDICATIONS: mirtazapine oral, ALLERGIES: NKDA  Clinician Response:  Dear Page,   Based on the details you've shared, you are concerned about contact with someone who may have been exposed to coronavirus (COVID-19). Unfortunately, you not meet criteria to be tested at [facility].  Testing for people who do not have symptoms is only required in certain instances, including direct contact with a person who has tested positive for COVID-19, or for those who are traveling to locations where testing is required beforehand.  Please redo an [facility] visit or call your clinic if you think we missed needed information, your exposure information has changed, or you develop symptoms.  What are the symptoms of COVID-19?  The most common symptoms are cough, fever and trouble breathing. Less common symptoms include headache, body aches, fatigue (feeling very tired), chills, sore throat, stuffy or runny nose, diarrhea (loose poop), loss of taste or smell, belly pain, and nausea or vomiting (feeling sick to your stomach or throwing up).  Where can I get more information?  [facility] -- About COVID-19:  [email or web address]  CDC -- What to Do If You're Sick: www.cdc.gov/coronavirus/2019-ncov/about/steps-when-sick.html  CDC -- Ending Home Isolation: www.cdc.gov/coronavirus/2019-ncov/hcp/disposition-in-home-patients.html  CDC -- Caring for Someone: www.cdc.gov/coronavirus/2019-ncov/if-you-are-sick/care-for-someone.html  [facility] -- Interim Guidance for Hospital Discharge to Home: www.[facility].[place]./diseases/coronavirus/hcp/hospdischarge.pdf  [facility] clinical trials (COVID-19 research studies): [email or web address].[email or web address]-clinical-trials  Below are the COVID-19 hotlines at the [place] [facility] ([facility]). Interpreters are available.  For health questions: Call 184-814-1177 or 1-332.304.3560 (7 a.m. to 7 p.m.)  For questions about schools and childcare: Call 516-994-9866 or 1-444.850.7322 (7 a.m. to 7 p.m.)    Diagnosis: Contact with and (suspected) exposure to other viral communicable diseases  Diagnosis ICD: Z20.828

## 2021-01-12 ENCOUNTER — ALLIED HEALTH/NURSE VISIT (OUTPATIENT)
Dept: NURSING | Facility: CLINIC | Age: 36
End: 2021-01-12
Payer: COMMERCIAL

## 2021-01-12 DIAGNOSIS — Z30.9 CONTRACEPTIVE MANAGEMENT: Primary | ICD-10-CM

## 2021-01-12 PROCEDURE — 96372 THER/PROPH/DIAG INJ SC/IM: CPT | Performed by: INTERNAL MEDICINE

## 2021-01-12 RX ORDER — MEDROXYPROGESTERONE ACETATE 150 MG/ML
150 INJECTION, SUSPENSION INTRAMUSCULAR
Status: ACTIVE | OUTPATIENT
Start: 2021-01-12 | End: 2021-07-11

## 2021-01-12 RX ADMIN — MEDROXYPROGESTERONE ACETATE 150 MG: 150 INJECTION, SUSPENSION INTRAMUSCULAR at 14:39

## 2021-01-12 NOTE — PROGRESS NOTES
Clinic Administered Medication Documentation      Depo Provera Documentation    URINE HCG: not indicated    Depo-Provera Standing Order inclusion/exclusion criteria reviewed.   Patient meets: inclusion criteria     BP: Data Unavailable  LAST PAP/EXAM: No results found for: PAP    Prior to injection, verified patient identity using patient's name and date of birth. Medication was administered. Please see MAR and medication order for additional information.     Was entire vial of medication used? Yes  Vial/Syringe: Single dose vial  Expiration Date:  04/2022    Patient instructed to remain in clinic for 15 minutes and report any adverse reaction to staff immediately .  NEXT INJECTION DUE: 3/30/21 - 4/13/21    Eladia BEDOYA R.N.

## 2021-01-22 ENCOUNTER — MYC MEDICAL ADVICE (OUTPATIENT)
Dept: FAMILY MEDICINE | Facility: CLINIC | Age: 36
End: 2021-01-22

## 2021-01-22 DIAGNOSIS — K50.919 CROHN'S DISEASE WITH COMPLICATION, UNSPECIFIED GASTROINTESTINAL TRACT LOCATION (H): ICD-10-CM

## 2021-01-22 DIAGNOSIS — R11.0 NAUSEA: ICD-10-CM

## 2021-01-22 RX ORDER — ONDANSETRON 4 MG/1
4 TABLET, ORALLY DISINTEGRATING ORAL EVERY 8 HOURS PRN
Qty: 10 TABLET | Refills: 0 | Status: SHIPPED | OUTPATIENT
Start: 2021-01-22 | End: 2021-06-11

## 2021-02-08 ENCOUNTER — MYC MEDICAL ADVICE (OUTPATIENT)
Dept: FAMILY MEDICINE | Facility: CLINIC | Age: 36
End: 2021-02-08

## 2021-02-08 DIAGNOSIS — E66.811 OBESITY, CLASS I, BMI 30-34.9: Primary | ICD-10-CM

## 2021-02-08 DIAGNOSIS — K50.90 CROHN'S DISEASE WITHOUT COMPLICATION, UNSPECIFIED GASTROINTESTINAL TRACT LOCATION (H): ICD-10-CM

## 2021-02-22 ENCOUNTER — TELEPHONE (OUTPATIENT)
Dept: EDUCATION SERVICES | Facility: CLINIC | Age: 36
End: 2021-02-22

## 2021-06-10 ENCOUNTER — MYC MEDICAL ADVICE (OUTPATIENT)
Dept: FAMILY MEDICINE | Facility: CLINIC | Age: 36
End: 2021-06-10

## 2021-06-10 DIAGNOSIS — F41.1 GENERALIZED ANXIETY DISORDER: ICD-10-CM

## 2021-06-10 DIAGNOSIS — I10 ESSENTIAL HYPERTENSION: ICD-10-CM

## 2021-06-10 DIAGNOSIS — R06.2 WHEEZING: ICD-10-CM

## 2021-06-10 DIAGNOSIS — K50.919 CROHN'S DISEASE WITH COMPLICATION, UNSPECIFIED GASTROINTESTINAL TRACT LOCATION (H): ICD-10-CM

## 2021-06-10 DIAGNOSIS — R11.0 NAUSEA: ICD-10-CM

## 2021-06-10 DIAGNOSIS — F33.1 MODERATE EPISODE OF RECURRENT MAJOR DEPRESSIVE DISORDER (H): ICD-10-CM

## 2021-06-11 RX ORDER — ONDANSETRON 4 MG/1
4 TABLET, ORALLY DISINTEGRATING ORAL EVERY 8 HOURS PRN
Qty: 10 TABLET | Refills: 1 | Status: SHIPPED | OUTPATIENT
Start: 2021-06-11

## 2021-06-11 RX ORDER — ALBUTEROL SULFATE 90 UG/1
2 AEROSOL, METERED RESPIRATORY (INHALATION) EVERY 4 HOURS PRN
Qty: 18 G | Refills: 3 | Status: SHIPPED | OUTPATIENT
Start: 2021-06-11

## 2021-06-11 RX ORDER — LISINOPRIL AND HYDROCHLOROTHIAZIDE 20; 25 MG/1; MG/1
1 TABLET ORAL DAILY
Qty: 90 TABLET | Refills: 0 | Status: SHIPPED | OUTPATIENT
Start: 2021-06-11

## 2021-06-11 NOTE — TELEPHONE ENCOUNTER
Patient requested refill through [facility]. Request sent to ec refill pool. Please send [facility] message when processed. Thanks. Denise Seals RN

## 2021-06-11 NOTE — TELEPHONE ENCOUNTER
Routing refill request to provider for review/approval because:  Labs out of range:    Creatinine   Date Value Ref Range Status   11/10/2020 0.51 (L) 0.52 - 1.04 mg/dL Final     Ila Segura RN

## 2021-06-11 NOTE — TELEPHONE ENCOUNTER
Medication is being filled for 1 time refill only due to:  Patient needs to be seen because follow up visit within 6 months. .

## 2021-08-04 ENCOUNTER — PATIENT OUTREACH (OUTPATIENT)
Dept: FAMILY MEDICINE | Facility: CLINIC | Age: 36
End: 2021-08-04

## 2021-08-04 NOTE — TELEPHONE ENCOUNTER
Patient Quality Outreach      Summary:    Patient has the following on her problem list/HM:   Immunizations     No immunizations due        Patient is due/failing the following:   Cervical Cancer Screening - PAP Needed, PHQ-9 Needed and Adult/Adolescent physical, date due: never been done before    Type of outreach:    Sent [employer] message.    Questions for provider review:    None                                                                                                                                     Sho SEYMOUR CMA       Postponed for 2 weeks.

## 2021-09-19 ENCOUNTER — HEALTH MAINTENANCE LETTER (OUTPATIENT)
Age: 36
End: 2021-09-19

## 2021-10-18 NOTE — TELEPHONE ENCOUNTER
Patient Quality Outreach 2nd Attempt      Summary:    Type of outreach:    Phone, left message for patient/parent to call back.    Next Steps:  Reach out within 90 days via Phone.    Max number of attempts reached: Yes. Will try again in 90 days if patient still on fail list.    Questions for provider review:    None                                                                                                                    Sho SEYMOUR CMA       Closing encounter.

## 2022-01-09 ENCOUNTER — HEALTH MAINTENANCE LETTER (OUTPATIENT)
Age: 37
End: 2022-01-09

## 2022-11-21 ENCOUNTER — HEALTH MAINTENANCE LETTER (OUTPATIENT)
Age: 37
End: 2022-11-21

## 2023-04-16 ENCOUNTER — HEALTH MAINTENANCE LETTER (OUTPATIENT)
Age: 38
End: 2023-04-16

## 2024-06-23 ENCOUNTER — HEALTH MAINTENANCE LETTER (OUTPATIENT)
Age: 39
End: 2024-06-23